# Patient Record
Sex: FEMALE | Race: WHITE | NOT HISPANIC OR LATINO | Employment: FULL TIME | URBAN - METROPOLITAN AREA
[De-identification: names, ages, dates, MRNs, and addresses within clinical notes are randomized per-mention and may not be internally consistent; named-entity substitution may affect disease eponyms.]

---

## 2017-01-26 ENCOUNTER — HOSPITAL ENCOUNTER (EMERGENCY)
Facility: HOSPITAL | Age: 42
Discharge: HOME | End: 2017-01-26
Attending: EMERGENCY MEDICINE
Payer: COMMERCIAL

## 2017-01-26 VITALS
OXYGEN SATURATION: 98 % | SYSTOLIC BLOOD PRESSURE: 118 MMHG | WEIGHT: 214.95 LBS | HEART RATE: 84 BPM | DIASTOLIC BLOOD PRESSURE: 69 MMHG | TEMPERATURE: 97.5 F | RESPIRATION RATE: 18 BRPM

## 2017-01-26 DIAGNOSIS — M54.50 ACUTE MIDLINE LOW BACK PAIN WITHOUT SCIATICA: Primary | ICD-10-CM

## 2017-01-26 PROCEDURE — 96372 THER/PROPH/DIAG INJ SC/IM: CPT

## 2017-01-26 PROCEDURE — A9270 NON-COVERED ITEM OR SERVICE: HCPCS | Performed by: EMERGENCY MEDICINE

## 2017-01-26 PROCEDURE — 99283 EMERGENCY DEPT VISIT LOW MDM: CPT

## 2017-01-26 RX ORDER — KETOROLAC TROMETHAMINE 30 MG/ML
15 INJECTION, SOLUTION INTRAMUSCULAR; INTRAVENOUS ONCE
Status: COMPLETED | OUTPATIENT
Start: 2017-01-26 | End: 2017-01-26

## 2017-01-26 RX ORDER — CYCLOBENZAPRINE HCL 10 MG
10 TABLET ORAL ONCE
Status: COMPLETED | OUTPATIENT
Start: 2017-01-26 | End: 2017-01-26

## 2017-01-26 RX ORDER — METAXALONE 800 MG/1
800 TABLET ORAL 3 TIMES DAILY PRN
Qty: 20 TABLET | Refills: 0 | Status: SHIPPED | OUTPATIENT
Start: 2017-01-26 | End: 2017-02-05

## 2017-01-26 RX ORDER — PREDNISONE 10 MG/1
40 TABLET ORAL DAILY
Qty: 20 TABLET | Refills: 0 | Status: SHIPPED | OUTPATIENT
Start: 2017-01-26 | End: 2017-01-31

## 2017-01-26 RX ADMIN — KETOROLAC TROMETHAMINE 15 MG: 30 INJECTION, SOLUTION INTRAMUSCULAR at 20:14

## 2017-01-26 RX ADMIN — CYCLOBENZAPRINE HYDROCHLORIDE 10 MG: 10 TABLET, FILM COATED ORAL at 20:14

## 2017-03-06 ENCOUNTER — APPOINTMENT (EMERGENCY)
Dept: RADIOLOGY | Facility: HOSPITAL | Age: 42
End: 2017-03-06
Payer: COMMERCIAL

## 2017-03-06 ENCOUNTER — HOSPITAL ENCOUNTER (EMERGENCY)
Facility: HOSPITAL | Age: 42
Discharge: HOME | End: 2017-03-06
Payer: COMMERCIAL

## 2017-03-06 VITALS
OXYGEN SATURATION: 97 % | WEIGHT: 206.13 LBS | TEMPERATURE: 98.4 F | RESPIRATION RATE: 18 BRPM | HEART RATE: 96 BPM | DIASTOLIC BLOOD PRESSURE: 73 MMHG | HEIGHT: 66 IN | BODY MASS INDEX: 33.13 KG/M2 | SYSTOLIC BLOOD PRESSURE: 128 MMHG

## 2017-03-06 DIAGNOSIS — R06.02 SOB (SHORTNESS OF BREATH): ICD-10-CM

## 2017-03-06 DIAGNOSIS — B97.89 SORE THROAT (VIRAL): Primary | ICD-10-CM

## 2017-03-06 DIAGNOSIS — J02.8 SORE THROAT (VIRAL): Primary | ICD-10-CM

## 2017-03-06 LAB
ANION GAP SERPL CALCULATED.3IONS-SCNC: 6 MMOL/L (ref 4–13)
ANION GAP SERPL CALCULATED.3IONS-SCNC: 7 MMOL/L (ref 4–13)
ATRIAL RATE: 98 BPM
BASOPHILS # BLD AUTO: 0.02 THOUSANDS/ΜL (ref 0–0.1)
BASOPHILS NFR BLD AUTO: 0 % (ref 0–1)
BUN SERPL-MCNC: 10 MG/DL (ref 5–25)
BUN SERPL-MCNC: 12 MG/DL (ref 5–25)
CALCIUM SERPL-MCNC: 8.7 MG/DL (ref 8.3–10.1)
CALCIUM SERPL-MCNC: 9.3 MG/DL (ref 8.3–10.1)
CHLORIDE SERPL-SCNC: 97 MMOL/L (ref 100–108)
CHLORIDE SERPL-SCNC: 99 MMOL/L (ref 100–108)
CO2 SERPL-SCNC: 28 MMOL/L (ref 21–32)
CO2 SERPL-SCNC: 28 MMOL/L (ref 21–32)
CREAT SERPL-MCNC: 0.6 MG/DL (ref 0.6–1.3)
CREAT SERPL-MCNC: 0.64 MG/DL (ref 0.6–1.3)
EOSINOPHIL # BLD AUTO: 0.09 THOUSAND/ΜL (ref 0–0.61)
EOSINOPHIL NFR BLD AUTO: 1 % (ref 0–6)
ERYTHROCYTE [DISTWIDTH] IN BLOOD BY AUTOMATED COUNT: 18.7 % (ref 11.6–15.1)
GFR SERPL CREATININE-BSD FRML MDRD: >60 ML/MIN/1.73SQ M
GFR SERPL CREATININE-BSD FRML MDRD: >60 ML/MIN/1.73SQ M
GLUCOSE SERPL-MCNC: 231 MG/DL (ref 65–140)
GLUCOSE SERPL-MCNC: 283 MG/DL (ref 65–140)
HCT VFR BLD AUTO: 34.4 % (ref 34.8–46.1)
HGB BLD-MCNC: 9.9 G/DL (ref 11.5–15.4)
LYMPHOCYTES # BLD AUTO: 2.72 THOUSANDS/ΜL (ref 0.6–4.47)
LYMPHOCYTES NFR BLD AUTO: 31 % (ref 14–44)
MCH RBC QN AUTO: 18.6 PG (ref 26.8–34.3)
MCHC RBC AUTO-ENTMCNC: 28.8 G/DL (ref 31.4–37.4)
MCV RBC AUTO: 65 FL (ref 82–98)
MONOCYTES # BLD AUTO: 0.62 THOUSAND/ΜL (ref 0.17–1.22)
MONOCYTES NFR BLD AUTO: 7 % (ref 4–12)
NEUTROPHILS # BLD AUTO: 5.42 THOUSANDS/ΜL (ref 1.85–7.62)
NEUTS SEG NFR BLD AUTO: 61 % (ref 43–75)
P AXIS: 30 DEGREES
PLATELET # BLD AUTO: 466 THOUSANDS/UL (ref 149–390)
PMV BLD AUTO: 9.5 FL (ref 8.9–12.7)
POTASSIUM SERPL-SCNC: 4.4 MMOL/L (ref 3.5–5.3)
POTASSIUM SERPL-SCNC: 4.5 MMOL/L (ref 3.5–5.3)
PR INTERVAL: 130 MS
QRS AXIS: 21 DEGREES
QRSD INTERVAL: 80 MS
QT INTERVAL: 332 MS
QTC INTERVAL: 423 MS
RBC # BLD AUTO: 5.31 MILLION/UL (ref 3.81–5.12)
S PYO AG THROAT QL: NEGATIVE
SODIUM SERPL-SCNC: 132 MMOL/L (ref 136–145)
SODIUM SERPL-SCNC: 133 MMOL/L (ref 136–145)
T WAVE AXIS: 45 DEGREES
TROPONIN I SERPL-MCNC: <0.02 NG/ML
VENTRICULAR RATE: 98 BPM
WBC # BLD AUTO: 8.87 THOUSAND/UL (ref 4.31–10.16)

## 2017-03-06 PROCEDURE — 85025 COMPLETE CBC W/AUTO DIFF WBC: CPT | Performed by: PHYSICIAN ASSISTANT

## 2017-03-06 PROCEDURE — 87147 CULTURE TYPE IMMUNOLOGIC: CPT | Performed by: PHYSICIAN ASSISTANT

## 2017-03-06 PROCEDURE — 36415 COLL VENOUS BLD VENIPUNCTURE: CPT | Performed by: PHYSICIAN ASSISTANT

## 2017-03-06 PROCEDURE — 87070 CULTURE OTHR SPECIMN AEROBIC: CPT | Performed by: PHYSICIAN ASSISTANT

## 2017-03-06 PROCEDURE — A9270 NON-COVERED ITEM OR SERVICE: HCPCS | Performed by: PHYSICIAN ASSISTANT

## 2017-03-06 PROCEDURE — 71020 HB CHEST X-RAY 2VW FRONTAL&LATL: CPT

## 2017-03-06 PROCEDURE — 80048 BASIC METABOLIC PNL TOTAL CA: CPT | Performed by: PHYSICIAN ASSISTANT

## 2017-03-06 PROCEDURE — 87430 STREP A AG IA: CPT | Performed by: PHYSICIAN ASSISTANT

## 2017-03-06 PROCEDURE — 93005 ELECTROCARDIOGRAM TRACING: CPT

## 2017-03-06 PROCEDURE — 84484 ASSAY OF TROPONIN QUANT: CPT | Performed by: PHYSICIAN ASSISTANT

## 2017-03-06 PROCEDURE — 99285 EMERGENCY DEPT VISIT HI MDM: CPT

## 2017-03-06 PROCEDURE — 96360 HYDRATION IV INFUSION INIT: CPT

## 2017-03-06 PROCEDURE — 93005 ELECTROCARDIOGRAM TRACING: CPT | Performed by: PHYSICIAN ASSISTANT

## 2017-03-06 PROCEDURE — 96361 HYDRATE IV INFUSION ADD-ON: CPT

## 2017-03-06 RX ORDER — ACETAMINOPHEN 325 MG/1
650 TABLET ORAL ONCE
Status: COMPLETED | OUTPATIENT
Start: 2017-03-06 | End: 2017-03-06

## 2017-03-06 RX ORDER — MAGNESIUM HYDROXIDE/ALUMINUM HYDROXICE/SIMETHICONE 120; 1200; 1200 MG/30ML; MG/30ML; MG/30ML
30 SUSPENSION ORAL ONCE
Status: COMPLETED | OUTPATIENT
Start: 2017-03-06 | End: 2017-03-06

## 2017-03-06 RX ADMIN — ALUMINUM HYDROXIDE, MAGNESIUM HYDROXIDE, AND SIMETHICONE 30 ML: 200; 200; 20 SUSPENSION ORAL at 10:17

## 2017-03-06 RX ADMIN — ACETAMINOPHEN 650 MG: 325 TABLET ORAL at 11:22

## 2017-03-06 RX ADMIN — SODIUM CHLORIDE 1000 ML: 0.9 INJECTION, SOLUTION INTRAVENOUS at 10:27

## 2017-03-06 RX ADMIN — LIDOCAINE HYDROCHLORIDE 15 ML: 20 SOLUTION ORAL; TOPICAL at 10:17

## 2017-03-08 LAB — BACTERIA THROAT CULT: NORMAL

## 2017-07-30 ENCOUNTER — OFFICE VISIT (OUTPATIENT)
Dept: URGENT CARE | Facility: MEDICAL CENTER | Age: 42
End: 2017-07-30

## 2017-07-30 PROCEDURE — G0382 LEV 3 HOSP TYPE B ED VISIT: HCPCS

## 2017-09-01 ENCOUNTER — APPOINTMENT (EMERGENCY)
Dept: CT IMAGING | Facility: HOSPITAL | Age: 42
End: 2017-09-01
Payer: COMMERCIAL

## 2017-09-01 ENCOUNTER — HOSPITAL ENCOUNTER (EMERGENCY)
Facility: HOSPITAL | Age: 42
Discharge: HOME | End: 2017-09-01
Attending: EMERGENCY MEDICINE | Admitting: EMERGENCY MEDICINE
Payer: COMMERCIAL

## 2017-09-01 VITALS
HEART RATE: 76 BPM | RESPIRATION RATE: 18 BRPM | WEIGHT: 202.16 LBS | OXYGEN SATURATION: 99 % | DIASTOLIC BLOOD PRESSURE: 79 MMHG | SYSTOLIC BLOOD PRESSURE: 165 MMHG | HEIGHT: 66 IN | TEMPERATURE: 98.4 F | BODY MASS INDEX: 32.49 KG/M2

## 2017-09-01 DIAGNOSIS — R10.9 ABDOMINAL PAIN: Primary | ICD-10-CM

## 2017-09-01 DIAGNOSIS — R19.7 DIARRHEA: ICD-10-CM

## 2017-09-01 DIAGNOSIS — D64.9 ANEMIA, UNSPECIFIED TYPE: ICD-10-CM

## 2017-09-01 LAB
ALBUMIN SERPL BCP-MCNC: 3.4 G/DL (ref 3.5–5)
ALP SERPL-CCNC: 69 U/L (ref 46–116)
ALT SERPL W P-5'-P-CCNC: 59 U/L (ref 12–78)
ANION GAP SERPL CALCULATED.3IONS-SCNC: 9 MMOL/L (ref 4–13)
AST SERPL W P-5'-P-CCNC: 42 U/L (ref 5–45)
BASOPHILS # BLD AUTO: 0.02 THOUSANDS/ΜL (ref 0–0.1)
BASOPHILS NFR BLD AUTO: 0 % (ref 0–1)
BILIRUB SERPL-MCNC: 0.1 MG/DL (ref 0.2–1)
BUN SERPL-MCNC: 11 MG/DL (ref 5–25)
CALCIUM SERPL-MCNC: 9 MG/DL (ref 8.3–10.1)
CHLORIDE SERPL-SCNC: 103 MMOL/L (ref 100–108)
CLARITY, POC: NORMAL
CO2 SERPL-SCNC: 24 MMOL/L (ref 21–32)
COLOR, POC: YELLOW
CREAT SERPL-MCNC: 0.54 MG/DL (ref 0.6–1.3)
EOSINOPHIL # BLD AUTO: 0.18 THOUSAND/ΜL (ref 0–0.61)
EOSINOPHIL NFR BLD AUTO: 2 % (ref 0–6)
ERYTHROCYTE [DISTWIDTH] IN BLOOD BY AUTOMATED COUNT: 18.6 % (ref 11.6–15.1)
EXT BILIRUBIN, UA: NEGATIVE
EXT BLOOD URINE: NEGATIVE
EXT GLUCOSE, UA: NEGATIVE
EXT KETONES: NEGATIVE
EXT NITRITE, UA: NEGATIVE
EXT PH, UA: 6
EXT PROTEIN, UA: NORMAL
EXT SPECIFIC GRAVITY, UA: 1
EXT UROBILINOGEN: NEGATIVE
GFR SERPL CREATININE-BSD FRML MDRD: 116 ML/MIN/1.73SQ M
GLUCOSE SERPL-MCNC: 180 MG/DL (ref 65–140)
HCG UR QL: NEGATIVE
HCT VFR BLD AUTO: 27.9 % (ref 34.8–46.1)
HGB BLD-MCNC: 7.9 G/DL (ref 11.5–15.4)
LIPASE SERPL-CCNC: 256 U/L (ref 73–393)
LYMPHOCYTES # BLD AUTO: 2.24 THOUSANDS/ΜL (ref 0.6–4.47)
LYMPHOCYTES NFR BLD AUTO: 30 % (ref 14–44)
MCH RBC QN AUTO: 17.5 PG (ref 26.8–34.3)
MCHC RBC AUTO-ENTMCNC: 28.3 G/DL (ref 31.4–37.4)
MCV RBC AUTO: 62 FL (ref 82–98)
MONOCYTES # BLD AUTO: 0.63 THOUSAND/ΜL (ref 0.17–1.22)
MONOCYTES NFR BLD AUTO: 8 % (ref 4–12)
NEUTROPHILS # BLD AUTO: 4.53 THOUSANDS/ΜL (ref 1.85–7.62)
NEUTS SEG NFR BLD AUTO: 60 % (ref 43–75)
PLATELET # BLD AUTO: 430 THOUSANDS/UL (ref 149–390)
PMV BLD AUTO: 9.2 FL (ref 8.9–12.7)
POTASSIUM SERPL-SCNC: 4.1 MMOL/L (ref 3.5–5.3)
PROT SERPL-MCNC: 7.5 G/DL (ref 6.4–8.2)
RBC # BLD AUTO: 4.51 MILLION/UL (ref 3.81–5.12)
SODIUM SERPL-SCNC: 136 MMOL/L (ref 136–145)
WBC # BLD AUTO: 7.6 THOUSAND/UL (ref 4.31–10.16)
WBC # BLD EST: NEGATIVE 10*3/UL

## 2017-09-01 PROCEDURE — 85025 COMPLETE CBC W/AUTO DIFF WBC: CPT | Performed by: EMERGENCY MEDICINE

## 2017-09-01 PROCEDURE — 96376 TX/PRO/DX INJ SAME DRUG ADON: CPT

## 2017-09-01 PROCEDURE — 80053 COMPREHEN METABOLIC PANEL: CPT | Performed by: EMERGENCY MEDICINE

## 2017-09-01 PROCEDURE — 96374 THER/PROPH/DIAG INJ IV PUSH: CPT

## 2017-09-01 PROCEDURE — 81025 URINE PREGNANCY TEST: CPT | Performed by: EMERGENCY MEDICINE

## 2017-09-01 PROCEDURE — 83690 ASSAY OF LIPASE: CPT | Performed by: EMERGENCY MEDICINE

## 2017-09-01 PROCEDURE — 96361 HYDRATE IV INFUSION ADD-ON: CPT

## 2017-09-01 PROCEDURE — 81002 URINALYSIS NONAUTO W/O SCOPE: CPT | Performed by: EMERGENCY MEDICINE

## 2017-09-01 PROCEDURE — 99284 EMERGENCY DEPT VISIT MOD MDM: CPT

## 2017-09-01 PROCEDURE — 74177 CT ABD & PELVIS W/CONTRAST: CPT

## 2017-09-01 PROCEDURE — 36415 COLL VENOUS BLD VENIPUNCTURE: CPT | Performed by: EMERGENCY MEDICINE

## 2017-09-01 RX ORDER — FERROUS SULFATE 325(65) MG
325 TABLET ORAL DAILY
Qty: 30 TABLET | Refills: 6 | Status: SHIPPED | OUTPATIENT
Start: 2017-09-01 | End: 2018-08-09

## 2017-09-01 RX ORDER — LISINOPRIL 20 MG/1
20 TABLET ORAL DAILY
COMMUNITY

## 2017-09-01 RX ADMIN — HYDROMORPHONE HYDROCHLORIDE 1 MG: 1 INJECTION, SOLUTION INTRAMUSCULAR; INTRAVENOUS; SUBCUTANEOUS at 07:54

## 2017-09-01 RX ADMIN — IOHEXOL 100 ML: 350 INJECTION, SOLUTION INTRAVENOUS at 07:46

## 2017-09-01 RX ADMIN — HYDROMORPHONE HYDROCHLORIDE 1 MG: 1 INJECTION, SOLUTION INTRAMUSCULAR; INTRAVENOUS; SUBCUTANEOUS at 06:36

## 2017-09-01 RX ADMIN — SODIUM CHLORIDE 1000 ML: 0.9 INJECTION, SOLUTION INTRAVENOUS at 06:36

## 2018-04-03 ENCOUNTER — APPOINTMENT (EMERGENCY)
Dept: CT IMAGING | Facility: HOSPITAL | Age: 43
End: 2018-04-03

## 2018-04-03 ENCOUNTER — HOSPITAL ENCOUNTER (EMERGENCY)
Facility: HOSPITAL | Age: 43
Discharge: HOME | End: 2018-04-03
Attending: EMERGENCY MEDICINE

## 2018-04-03 VITALS
RESPIRATION RATE: 20 BRPM | OXYGEN SATURATION: 99 % | HEIGHT: 65 IN | SYSTOLIC BLOOD PRESSURE: 145 MMHG | BODY MASS INDEX: 34.16 KG/M2 | WEIGHT: 205 LBS | HEART RATE: 105 BPM | DIASTOLIC BLOOD PRESSURE: 82 MMHG

## 2018-04-03 DIAGNOSIS — L02.91 ABSCESS: Primary | ICD-10-CM

## 2018-04-03 PROCEDURE — 99284 EMERGENCY DEPT VISIT MOD MDM: CPT

## 2018-04-03 PROCEDURE — 74176 CT ABD & PELVIS W/O CONTRAST: CPT

## 2018-04-03 RX ORDER — CLINDAMYCIN HYDROCHLORIDE 150 MG/1
300 CAPSULE ORAL EVERY 8 HOURS SCHEDULED
Qty: 28 CAPSULE | Refills: 0 | Status: SHIPPED | OUTPATIENT
Start: 2018-04-03 | End: 2018-04-10

## 2018-04-03 RX ORDER — OXYCODONE HYDROCHLORIDE AND ACETAMINOPHEN 5; 325 MG/1; MG/1
1 TABLET ORAL EVERY 6 HOURS PRN
Qty: 6 TABLET | Refills: 0 | Status: SHIPPED | OUTPATIENT
Start: 2018-04-03 | End: 2018-04-09

## 2018-04-03 RX ORDER — CLINDAMYCIN HYDROCHLORIDE 150 MG/1
300 CAPSULE ORAL ONCE
Status: COMPLETED | OUTPATIENT
Start: 2018-04-03 | End: 2018-04-03

## 2018-04-03 RX ORDER — LIDOCAINE HYDROCHLORIDE AND EPINEPHRINE 10; 10 MG/ML; UG/ML
5 INJECTION, SOLUTION INFILTRATION; PERINEURAL ONCE
Status: COMPLETED | OUTPATIENT
Start: 2018-04-03 | End: 2018-04-03

## 2018-04-03 RX ORDER — OXYCODONE HYDROCHLORIDE AND ACETAMINOPHEN 5; 325 MG/1; MG/1
1 TABLET ORAL ONCE
Status: DISCONTINUED | OUTPATIENT
Start: 2018-04-03 | End: 2018-04-03 | Stop reason: HOSPADM

## 2018-04-03 RX ADMIN — LIDOCAINE HYDROCHLORIDE,EPINEPHRINE BITARTRATE 5 ML: 10; .01 INJECTION, SOLUTION INFILTRATION; PERINEURAL at 16:29

## 2018-04-03 RX ADMIN — CLINDAMYCIN HYDROCHLORIDE 300 MG: 150 CAPSULE ORAL at 15:50

## 2018-04-03 NOTE — ED PROVIDER NOTES
History  Chief Complaint   Patient presents with    Abdominal Pain     Pt states bulge to right side lower abdomen x 2 days and is painful  +nausea  Denies urinary symptoms  History provided by:  Patient  Rash   Location: Lower abdomen  Quality: painful and redness    Pain details:     Quality:  Aching    Severity:  Moderate    Onset quality:  Gradual    Timing:  Constant  Severity:  Moderate  Onset quality:  Gradual  Duration:  2 days  Timing:  Constant  Progression:  Worsening  Chronicity:  New  Context: not animal contact, not chemical exposure, not diapers, not eggs, not exposure to similar rash and not food    Relieved by:  None tried  Worsened by:  Nothing  Ineffective treatments:  None tried  Associated symptoms: abdominal pain    Associated symptoms: no diarrhea, no fatigue, no fever, no headaches, no hoarse voice, no induration, no joint pain, no myalgias, no nausea, no periorbital edema, no shortness of breath, no sore throat, no throat swelling, no tongue swelling, no URI, not vomiting and not wheezing        Prior to Admission Medications   Prescriptions Last Dose Informant Patient Reported? Taking? SITagliptin-MetFORMIN HCl ER (JANUMET XR) 100-1000 MG TB24   Yes No   Sig: Take 1 tablet by mouth daily with dinner   ferrous sulfate 325 (65 Fe) mg tablet   No No   Sig: Take 1 tablet by mouth daily for 30 doses   lisinopril (ZESTRIL) 20 mg tablet   Yes No   Sig: Take 20 mg by mouth daily      Facility-Administered Medications: None       Past Medical History:   Diagnosis Date    Diabetes mellitus ([address] )     Gastritis     Hypertension        Past Surgical History:   Procedure Laterality Date     SECTION         History reviewed  No pertinent family history  I have reviewed and agree with the history as documented      Social History   Substance Use Topics    Smoking status: Current Some Day Smoker    Smokeless tobacco: Not on file    Alcohol use Yes      Comment: socially Review of Systems   Constitutional: Positive for activity change  Negative for appetite change, chills, fatigue and fever  HENT: Negative for congestion, dental problem, ear discharge, ear pain, hoarse voice, mouth sores, postnasal drip, rhinorrhea and sore throat  Respiratory: Negative for shortness of breath and wheezing  Gastrointestinal: Positive for abdominal pain  Negative for diarrhea, nausea and vomiting  Genitourinary: Negative for dysuria, frequency and urgency  Musculoskeletal: Negative for arthralgias and myalgias  Skin: Positive for color change and rash  Neurological: Negative for headaches  Hematological: Negative for adenopathy  Does not bruise/bleed easily  Psychiatric/Behavioral: Negative for confusion  All other systems reviewed and are negative  Physical Exam  ED Triage Vitals [04/03/18 1456]   Temp Pulse Respirations Blood Pressure SpO2   -- 105 20 145/82 99 %      Temp src Heart Rate Source Patient Position - Orthostatic VS BP Location FiO2 (%)   -- -- -- -- --      Pain Score       9           Orthostatic Vital Signs  Vitals:    04/03/18 1456   BP: 145/82   Pulse: 105       Physical Exam   Constitutional: She is oriented to person, place, and time  She appears well-developed and well-nourished  No distress  HENT:   Head: Normocephalic  Right Ear: External ear normal    Left Ear: External ear normal    Nose: Nose normal    Mouth/Throat: Oropharynx is clear and moist    Eyes: Conjunctivae are normal  Right eye exhibits no discharge  Left eye exhibits no discharge  Neck: Neck supple  Cardiovascular: Normal rate, regular rhythm and normal heart sounds  Exam reveals no friction rub  No murmur heard  Pulmonary/Chest: Effort normal and breath sounds normal  No respiratory distress  She has no wheezes  She has no rales  Abdominal: Soft  She exhibits no mass  There is tenderness  There is no rebound and no guarding  Musculoskeletal: She exhibits no edema  Neurological: She is alert and oriented to person, place, and time  Skin: Skin is warm  She is not diaphoretic  Nursing note and vitals reviewed  ED Medications  Medications   oxyCODONE-acetaminophen (PERCOCET) 5-325 mg per tablet 1 tablet (1 tablet Oral Not Given 4/3/18 1629)   lidocaine-epinephrine (XYLOCAINE/EPINEPHRINE) 1 %-1:100,000 injection 5 mL (5 mL Infiltration Given 4/3/18 1629)   clindamycin (CLEOCIN) capsule 300 mg (300 mg Oral Given 4/3/18 1550)       Diagnostic Studies  Results Reviewed     None                 CT abdomen pelvis wo contrast   ED Interpretation by Adam Fishman PA-C (04/03 1616)   No pelvic wall herniation  Focal area of right anterior pelvic wall skin thickening  No collection      Final Result by Autumn Lee MD (04/03 1613)      No pelvic wall herniation  Focal area of right anterior pelvic wall skin thickening  No collection         Workstation performed: [id number]                    Procedures  Incision/Drainage  Date/Time: 4/3/2018 4:31 PM  Performed by: Tiffanie Rowell  Authorized by: Kenn Draper     Patient location:  ED  Other Assisting Provider: No    Consent:     Consent obtained:  Verbal    Consent given by:  Patient    Risks discussed:  Bleeding, incomplete drainage, pain, damage to other organs and infection    Alternatives discussed:  No treatment  Universal protocol:     Procedure explained and questions answered to patient or proxy's satisfaction: yes      Site/side marked: yes      Immediately prior to procedure a time out was called: yes      Patient identity confirmed:  Verbally with patient  Location:     Type:  Abscess    Size:  1 cm x 1 cm  Pre-procedure details:     Skin preparation:  Betadine  Anesthesia (see MAR for exact dosages):      Anesthesia method:  Local infiltration    Local anesthetic:  Lidocaine 1% WITH epi  Procedure details:     Complexity:  Simple    Needle aspiration: no      Incision types:  Single straight    Scalpel blade:  11    Approach:  Open    Incision depth:  Skin and subcutaneous    Wound management:  Irrigated with saline and probed and deloculated    Irrigation with saline:  Copiously    Drainage:  Purulent    Drainage amount: Moderate    Wound treatment:  Packing placed    Packing materials:  1/4 in iodoform gauze    Amount 1/4" iodoform:  6 cm  Post-procedure details:     Patient tolerance of procedure: Tolerated well, no immediate complications           Phone Contacts  ED Phone Contact    ED Course  ED Course as of Apr 03 1650   Tue Apr 03, 2018   1554 Patient is insistant on a CT scan to make sure that this is not a hernia  Will dry scan  1608 No hernia seen                                MDM  Number of Diagnoses or Management Options  Abscess: new and requires workup     Amount and/or Complexity of Data Reviewed  Tests in the radiology section of CPT®: ordered and reviewed  Tests in the medicine section of CPT®: ordered and reviewed    Risk of Complications, Morbidity, and/or Mortality  Presenting problems: moderate  Diagnostic procedures: moderate  Management options: moderate  General comments: Patient presents emergency room with pain in her lower abdomen  She was seen and evaluated and diagnosed with an abscess  She was requesting a CT scan to make sure that she did not have a hernia  CT scan was normal  An I and D was performed  It was packed with iodoform gauze  She was given a prescription for clindamycin  She is a recovering addict so she is going to take her Motrin at home for pain  She also has Suboxone at home that she uses  She will follow up with her family doctor in 2 days for removal of the packing  She also if comfortable, may  remove the packing herself      Patient Progress  Patient progress: stable    CritCare Time    Disposition  Final diagnoses:   Abscess - abdominal wall     Time reflects when diagnosis was documented in both MDM as applicable and the Disposition within this note     Time User Action Codes Description Comment    4/3/2018  3:39 PM Mili Kwan Add [L02 91] Abscess     4/3/2018  3:39 PM Mili Kwan Modify [L02 91] Abscess abdominal wall      ED Disposition     ED Disposition Condition Comment    Discharge  Keke Henson discharge to home/self care  Condition at discharge: Good        Follow-up Information     Follow up With Specialties Details Why Contact Info Additional [address] Emergency Department Emergency Medicine In 2 days for packing removal [address] [facility]  AN ED, [address], [place], [place], [address]        Discharge Medication List as of 4/3/2018  3:42 PM      START taking these medications    Details   clindamycin (CLEOCIN) 150 mg capsule Take 2 capsules (300 mg total) by mouth every 8 (eight) hours for 7 days, Starting Tue 4/3/2018, Until Tue 4/10/2018, Normal      oxyCODONE-acetaminophen (PERCOCET) 5-325 mg per tablet Take 1 tablet by mouth every 6 (six) hours as needed for moderate pain for up to 6 days Max Daily Amount: 4 tablets, Starting Tue 4/3/2018, Until Mon 4/9/2018, Print         CONTINUE these medications which have NOT CHANGED    Details   ferrous sulfate 325 (65 Fe) mg tablet Take 1 tablet by mouth daily for 30 doses, Starting Fri 9/1/2017, Until Sun 10/1/2017, Print      lisinopril (ZESTRIL) 20 mg tablet Take 20 mg by mouth daily, Historical Med      SITagliptin-MetFORMIN HCl ER (JANUMET XR) 100-1000 MG TB24 Take 1 tablet by mouth daily with dinner, Historical Med           No discharge procedures on file      ED Provider  Electronically Signed by           Shane Kilpatrick PA-C  04/03/18 5336

## 2018-08-09 ENCOUNTER — APPOINTMENT (EMERGENCY)
Dept: CT IMAGING | Facility: HOSPITAL | Age: 43
End: 2018-08-09

## 2018-08-09 ENCOUNTER — HOSPITAL ENCOUNTER (EMERGENCY)
Facility: HOSPITAL | Age: 43
Discharge: HOME | End: 2018-08-09
Attending: EMERGENCY MEDICINE | Admitting: EMERGENCY MEDICINE

## 2018-08-09 VITALS
OXYGEN SATURATION: 99 % | SYSTOLIC BLOOD PRESSURE: 127 MMHG | BODY MASS INDEX: 33.6 KG/M2 | TEMPERATURE: 97.7 F | HEART RATE: 74 BPM | DIASTOLIC BLOOD PRESSURE: 75 MMHG | WEIGHT: 201.94 LBS | RESPIRATION RATE: 18 BRPM

## 2018-08-09 DIAGNOSIS — K29.00 ACUTE GASTRITIS: Primary | ICD-10-CM

## 2018-08-09 DIAGNOSIS — R10.84 ACUTE GENERALIZED ABDOMINAL PAIN: ICD-10-CM

## 2018-08-09 LAB
ALBUMIN SERPL BCP-MCNC: 3.5 G/DL (ref 3.5–5)
ALP SERPL-CCNC: 77 U/L (ref 46–116)
ALT SERPL W P-5'-P-CCNC: 60 U/L (ref 12–78)
ANION GAP SERPL CALCULATED.3IONS-SCNC: 8 MMOL/L (ref 4–13)
APTT PPP: 32 SECONDS (ref 24–36)
AST SERPL W P-5'-P-CCNC: 47 U/L (ref 5–45)
BASOPHILS # BLD AUTO: 0.03 THOUSANDS/ΜL (ref 0–0.1)
BASOPHILS NFR BLD AUTO: 0 % (ref 0–1)
BILIRUB DIRECT SERPL-MCNC: 0.09 MG/DL (ref 0–0.2)
BILIRUB SERPL-MCNC: 0.3 MG/DL (ref 0.2–1)
BUN SERPL-MCNC: 11 MG/DL (ref 5–25)
CALCIUM SERPL-MCNC: 8.8 MG/DL (ref 8.3–10.1)
CHLORIDE SERPL-SCNC: 99 MMOL/L (ref 100–108)
CO2 SERPL-SCNC: 26 MMOL/L (ref 21–32)
CREAT SERPL-MCNC: 0.66 MG/DL (ref 0.6–1.3)
EOSINOPHIL # BLD AUTO: 0.22 THOUSAND/ΜL (ref 0–0.61)
EOSINOPHIL NFR BLD AUTO: 3 % (ref 0–6)
ERYTHROCYTE [DISTWIDTH] IN BLOOD BY AUTOMATED COUNT: 18.5 % (ref 11.6–15.1)
GFR SERPL CREATININE-BSD FRML MDRD: 108 ML/MIN/1.73SQ M
GLUCOSE SERPL-MCNC: 235 MG/DL (ref 65–140)
HCT VFR BLD AUTO: 31.5 % (ref 34.8–46.1)
HGB BLD-MCNC: 8.5 G/DL (ref 11.5–15.4)
IMM GRANULOCYTES # BLD AUTO: 0.01 THOUSAND/UL (ref 0–0.2)
IMM GRANULOCYTES NFR BLD AUTO: 0 % (ref 0–2)
INR PPP: 1.02 (ref 0.86–1.17)
LIPASE SERPL-CCNC: 129 U/L (ref 73–393)
LYMPHOCYTES # BLD AUTO: 3.06 THOUSANDS/ΜL (ref 0.6–4.47)
LYMPHOCYTES NFR BLD AUTO: 40 % (ref 14–44)
MCH RBC QN AUTO: 17 PG (ref 26.8–34.3)
MCHC RBC AUTO-ENTMCNC: 27 G/DL (ref 31.4–37.4)
MCV RBC AUTO: 63 FL (ref 82–98)
MONOCYTES # BLD AUTO: 0.64 THOUSAND/ΜL (ref 0.17–1.22)
MONOCYTES NFR BLD AUTO: 8 % (ref 4–12)
NEUTROPHILS # BLD AUTO: 3.75 THOUSANDS/ΜL (ref 1.85–7.62)
NEUTS SEG NFR BLD AUTO: 49 % (ref 43–75)
NRBC BLD AUTO-RTO: 0 /100 WBCS
PLATELET # BLD AUTO: 329 THOUSANDS/UL (ref 149–390)
PMV BLD AUTO: 9.3 FL (ref 8.9–12.7)
POTASSIUM SERPL-SCNC: 3.8 MMOL/L (ref 3.5–5.3)
PROT SERPL-MCNC: 7.4 G/DL (ref 6.4–8.2)
PROTHROMBIN TIME: 13.1 SECONDS (ref 11.8–14.2)
RBC # BLD AUTO: 4.99 MILLION/UL (ref 3.81–5.12)
SODIUM SERPL-SCNC: 133 MMOL/L (ref 136–145)
WBC # BLD AUTO: 7.71 THOUSAND/UL (ref 4.31–10.16)

## 2018-08-09 PROCEDURE — 96376 TX/PRO/DX INJ SAME DRUG ADON: CPT

## 2018-08-09 PROCEDURE — 96374 THER/PROPH/DIAG INJ IV PUSH: CPT

## 2018-08-09 PROCEDURE — 85730 THROMBOPLASTIN TIME PARTIAL: CPT | Performed by: EMERGENCY MEDICINE

## 2018-08-09 PROCEDURE — 36415 COLL VENOUS BLD VENIPUNCTURE: CPT | Performed by: EMERGENCY MEDICINE

## 2018-08-09 PROCEDURE — 85610 PROTHROMBIN TIME: CPT | Performed by: EMERGENCY MEDICINE

## 2018-08-09 PROCEDURE — 99284 EMERGENCY DEPT VISIT MOD MDM: CPT

## 2018-08-09 PROCEDURE — 74177 CT ABD & PELVIS W/CONTRAST: CPT

## 2018-08-09 PROCEDURE — 96361 HYDRATE IV INFUSION ADD-ON: CPT

## 2018-08-09 PROCEDURE — 80076 HEPATIC FUNCTION PANEL: CPT | Performed by: EMERGENCY MEDICINE

## 2018-08-09 PROCEDURE — 85025 COMPLETE CBC W/AUTO DIFF WBC: CPT | Performed by: EMERGENCY MEDICINE

## 2018-08-09 PROCEDURE — 83690 ASSAY OF LIPASE: CPT | Performed by: EMERGENCY MEDICINE

## 2018-08-09 PROCEDURE — 96375 TX/PRO/DX INJ NEW DRUG ADDON: CPT

## 2018-08-09 PROCEDURE — 80048 BASIC METABOLIC PNL TOTAL CA: CPT | Performed by: EMERGENCY MEDICINE

## 2018-08-09 RX ORDER — ONDANSETRON 4 MG/1
4 TABLET, ORALLY DISINTEGRATING ORAL EVERY 8 HOURS PRN
Qty: 20 TABLET | Refills: 0 | Status: SHIPPED | OUTPATIENT
Start: 2018-08-09 | End: 2021-02-17 | Stop reason: HOSPADM

## 2018-08-09 RX ORDER — MORPHINE SULFATE 10 MG/ML
6 INJECTION, SOLUTION INTRAMUSCULAR; INTRAVENOUS ONCE
Status: COMPLETED | OUTPATIENT
Start: 2018-08-09 | End: 2018-08-09

## 2018-08-09 RX ORDER — DICYCLOMINE HCL 20 MG
20 TABLET ORAL 2 TIMES DAILY PRN
Qty: 15 TABLET | Refills: 0 | Status: SHIPPED | OUTPATIENT
Start: 2018-08-09 | End: 2021-02-17 | Stop reason: HOSPADM

## 2018-08-09 RX ORDER — BUPRENORPHINE HYDROCHLORIDE AND NALOXONE HYDROCHLORIDE DIHYDRATE 8; 2 MG/1; MG/1
1 TABLET SUBLINGUAL 2 TIMES DAILY
COMMUNITY

## 2018-08-09 RX ORDER — SUCRALFATE ORAL 1 G/10ML
1 SUSPENSION ORAL 2 TIMES DAILY
Qty: 420 ML | Refills: 0 | Status: SHIPPED | OUTPATIENT
Start: 2018-08-09 | End: 2021-02-17 | Stop reason: HOSPADM

## 2018-08-09 RX ORDER — ONDANSETRON 2 MG/ML
4 INJECTION INTRAMUSCULAR; INTRAVENOUS ONCE
Status: COMPLETED | OUTPATIENT
Start: 2018-08-09 | End: 2018-08-09

## 2018-08-09 RX ORDER — OMEPRAZOLE 20 MG/1
20 CAPSULE, DELAYED RELEASE ORAL DAILY
Qty: 14 CAPSULE | Refills: 0 | Status: SHIPPED | OUTPATIENT
Start: 2018-08-09 | End: 2021-02-17 | Stop reason: HOSPADM

## 2018-08-09 RX ADMIN — MORPHINE SULFATE 6 MG: 10 INJECTION INTRAVENOUS at 08:46

## 2018-08-09 RX ADMIN — ONDANSETRON 4 MG: 2 INJECTION, SOLUTION INTRAMUSCULAR; INTRAVENOUS at 10:40

## 2018-08-09 RX ADMIN — IOHEXOL 100 ML: 350 INJECTION, SOLUTION INTRAVENOUS at 10:21

## 2018-08-09 RX ADMIN — SODIUM CHLORIDE 1000 ML: 0.9 INJECTION, SOLUTION INTRAVENOUS at 08:23

## 2018-08-09 RX ADMIN — ONDANSETRON 4 MG: 2 INJECTION INTRAMUSCULAR; INTRAVENOUS at 08:24

## 2018-08-09 NOTE — ED PROVIDER NOTES
History  Chief Complaint   Patient presents with    Vomiting     states she woke up approx two hours ago and began vomiting  denies diarrhea or any other recent illness  hx of gastritis  History provided by:  Patient  Vomiting   Severity:  Moderate  Duration:  2 hours  Timing:  Intermittent  Number of daily episodes:  8  Quality:  Stomach contents (Now dry heaving)  Progression:  Unchanged  Chronicity:  New  Recent urination:  Normal  Context comment:  States last night she was up all night studying went to sleep at 4:00 a m  woke up 2 hours later vomiting , dry heaving since  Complaining of epigastric abdominal pain as well  Relieved by:  None tried  Exacerbated by: Attempting to drink or eat  Ineffective treatments:  None tried  Associated symptoms: abdominal pain    Associated symptoms: no chills, no cough, no diarrhea, no fever, no headaches and no sore throat    Abdominal pain:     Location:  Epigastric    Quality: aching and burning      Severity:  Moderate    Onset quality:  Gradual    Duration:  2 hours    Timing:  Constant    Progression:  Worsening    Chronicity:  New      Prior to Admission Medications   Prescriptions Last Dose Informant Patient Reported? Taking? buprenorphine-naloxone (SUBOXONE) 8-2 mg per SL tablet   Yes Yes   Sig: Place 1 tablet under the tongue 2 (two) times a day   lisinopril (ZESTRIL) 20 mg tablet   Yes Yes   Sig: Take 20 mg by mouth daily      Facility-Administered Medications: None       Past Medical History:   Diagnosis Date    Diabetes mellitus ([facility] [address] )     Gastritis     Hypertension        Past Surgical History:   Procedure Laterality Date     SECTION         History reviewed  No pertinent family history  I have reviewed and agree with the history as documented      Social History   Substance Use Topics    Smoking status: Current Some Day Smoker    Smokeless tobacco: Never Used    Alcohol use Yes      Comment: socially        Review of Systems Constitutional: Negative for activity change, chills, diaphoresis and fever  HENT: Negative for congestion, sinus pressure and sore throat  Eyes: Negative for pain and visual disturbance  Respiratory: Negative for cough, chest tightness, shortness of breath, wheezing and stridor  Cardiovascular: Negative for chest pain and palpitations  Gastrointestinal: Positive for abdominal pain and vomiting  Negative for abdominal distention, constipation, diarrhea and nausea  Genitourinary: Negative for dysuria and frequency  Musculoskeletal: Negative for neck pain and neck stiffness  Skin: Negative for rash  Neurological: Negative for dizziness, speech difficulty, light-headedness, numbness and headaches  Physical Exam  Physical Exam   Constitutional: She is oriented to person, place, and time  She appears well-developed  No distress  HENT:   Head: Normocephalic and atraumatic  Eyes: Pupils are equal, round, and reactive to light  Neck: Normal range of motion  Neck supple  No tracheal deviation present  Cardiovascular: Normal rate, regular rhythm, normal heart sounds and intact distal pulses  No murmur heard  Pulmonary/Chest: Effort normal and breath sounds normal  No stridor  No respiratory distress  Abdominal: Soft  She exhibits no distension  There is tenderness (epigastric)  There is no rebound and no guarding  Musculoskeletal: Normal range of motion  Neurological: She is alert and oriented to person, place, and time  Skin: Skin is warm and dry  She is not diaphoretic  No erythema  No pallor  Psychiatric: She has a normal mood and affect  Vitals reviewed        Vital Signs  ED Triage Vitals   Temperature Pulse Respirations Blood Pressure SpO2   08/09/18 0802 08/09/18 0802 08/09/18 0802 08/09/18 0802 08/09/18 0802   97 7 °F (36 5 °C) 72 18 133/85 99 %      Temp src Heart Rate Source Patient Position - Orthostatic VS BP Location FiO2 (%)   -- 08/09/18 0802 08/09/18 7513 08/09/18 0802 --    Monitor Sitting Right arm       Pain Score       08/09/18 0846       Worst Possible Pain           Vitals:    08/09/18 0802 08/09/18 0930 08/09/18 1000   BP: 133/85 137/68 127/75   Pulse: 72 76 74   Patient Position - Orthostatic VS: Sitting         Visual Acuity      ED Medications  Medications   sodium chloride 0 9 % bolus 1,000 mL (1,000 mL Intravenous New Bag 8/9/18 0823)   ondansetron (ZOFRAN) injection 4 mg (4 mg Intravenous Given 8/9/18 0824)   morphine (PF) 10 mg/mL injection 6 mg (6 mg Intravenous Given 8/9/18 0846)   iohexol (OMNIPAQUE) 350 MG/ML injection (MULTI-DOSE) 100 mL (100 mL Intravenous Given 8/9/18 1021)   ondansetron (ZOFRAN) injection 4 mg (4 mg Intravenous Given 8/9/18 1040)       Diagnostic Studies  Results Reviewed     Procedure Component Value Units Date/Time    Hepatic function panel [[id number]]  (Abnormal) Collected:  08/09/18 0826    Lab Status:  Final result Specimen:  Blood from Arm, Left Updated:  08/09/18 0906     Total Bilirubin 0 30 mg/dL      Bilirubin, Direct 0 09 mg/dL      Alkaline Phosphatase 77 U/L      AST 47 (H) U/L      ALT 60 U/L      Total Protein 7 4 g/dL      Albumin 3 5 g/dL     Basic metabolic panel [[id number]]  (Abnormal) Collected:  08/09/18 0826    Lab Status:  Final result Specimen:  Blood from Arm, Left Updated:  08/09/18 5160     Sodium 133 (L) mmol/L      Potassium 3 8 mmol/L      Chloride 99 (L) mmol/L      CO2 26 mmol/L      Anion Gap 8 mmol/L      BUN 11 mg/dL      Creatinine 0 66 mg/dL      Glucose 235 (H) mg/dL      Calcium 8 8 mg/dL      eGFR 108 ml/min/1 73sq m     Narrative:         National Kidney Disease Education Program recommendations are as follows:  GFR calculation is accurate only with a steady state creatinine  Chronic Kidney disease less than 60 ml/min/1 73 sq  meters  Kidney failure less than 15 ml/min/1 73 sq  meters      Lipase [[id number]]  (Normal) Collected:  08/09/18 0826    Lab Status:  Final result Specimen:  Blood from Arm, Left Updated:  08/09/18 0855     Lipase 129 u/L     Protime-INR [[id number]]  (Normal) Collected:  08/09/18 0826    Lab Status:  Final result Specimen:  Blood from Arm, Left Updated:  08/09/18 0855     Protime 13 1 seconds      INR 1 02    APTT [[id number]]  (Normal) Collected:  08/09/18 0826    Lab Status:  Final result Specimen:  Blood from Arm, Left Updated:  08/09/18 0855     PTT 32 seconds     CBC and differential [[id number]]  (Abnormal) Collected:  08/09/18 0826    Lab Status:  Final result Specimen:  Blood from Arm, Left Updated:  08/09/18 0833     WBC 7 71 Thousand/uL      RBC 4 99 Million/uL      Hemoglobin 8 5 (L) g/dL      Hematocrit 31 5 (L) %      MCV 63 (L) fL      MCH 17 0 (L) pg      MCHC 27 0 (L) g/dL      RDW 18 5 (H) %      MPV 9 3 fL      Platelets 860 Thousands/uL      nRBC 0 /100 WBCs      Neutrophils Relative 49 %      Immat GRANS % 0 %      Lymphocytes Relative 40 %      Monocytes Relative 8 %      Eosinophils Relative 3 %      Basophils Relative 0 %      Neutrophils Absolute 3 75 Thousands/µL      Immature Grans Absolute 0 01 Thousand/uL      Lymphocytes Absolute 3 06 Thousands/µL      Monocytes Absolute 0 64 Thousand/µL      Eosinophils Absolute 0 22 Thousand/µL      Basophils Absolute 0 03 Thousands/µL                  CT abdomen pelvis with contrast   Final Result by Korey Curtis DO (08/09 1045)   1  Mild hepatomegaly with fatty infiltration  2   Mild splenomegaly  Otherwise, unremarkable enhanced CT scan of the abdomen and pelvis  Workstation performed: [id number]                    Procedures  Procedures       Phone Contacts  ED Phone Contact    ED Course  ED Course as of Aug 09 1105   Thu Aug 09, 2018   8656 Patient anemic but this is actually increased from previous blood draw which was 7 9   11 months ago    0839 Called patient's room pain worsening  , will give dose of morphine will order CT                                MDM  Number of Diagnoses or Management Options  Acute gastritis: new and requires workup  Acute generalized abdominal pain: new and requires workup  Diagnosis management comments: No etiology found for patient's pain  , will start on PPI and Carafate follow up with outpatient Gastroenterology  Amount and/or Complexity of Data Reviewed  Clinical lab tests: ordered and reviewed  Tests in the radiology section of CPT®: ordered and reviewed  Review and summarize past medical records: yes  Independent visualization of images, tracings, or specimens: yes      CritCare Time    Disposition  Final diagnoses:   Acute gastritis   Acute generalized abdominal pain     Time reflects when diagnosis was documented in both MDM as applicable and the Disposition within this note     Time User Action Codes Description Comment    8/9/2018 10:58 AM Faith DANIEL Add [K29 00] Acute gastritis     8/9/2018 10:58 AM Nisreen Blank Add [R10 84] Acute generalized abdominal pain       ED Disposition     ED Disposition Condition Comment    Discharge  Vester Grady discharge to home/self care  Condition at discharge: Good        Follow-up Information     Follow up With Specialties Details Why Contact Info    Taylor Rajan MD Family Medicine Call today To arrange for the next available appointment [address]      Skylar Cummings MD Gastroenterology Call today To arrange for the next available appointment [address] [place] [address] 8  [phone]            Patient's Medications   Discharge Prescriptions    OMEPRAZOLE (PRILOSEC) 20 MG DELAYED RELEASE CAPSULE    Take 1 capsule (20 mg total) by mouth daily for 14 days       Start Date: 8/9/2018  End Date: 8/23/2018       Order Dose: 20 mg       Quantity: 14 capsule    Refills: 0    ONDANSETRON (ZOFRAN-ODT) 4 MG DISINTEGRATING TABLET    Take 1 tablet (4 mg total) by mouth every 8 (eight) hours as needed for nausea or vomiting for up to 15 doses       Start Date: 8/9/2018  End Date: --       Order Dose: 4 mg       Quantity: 20 tablet    Refills: 0    SUCRALFATE (CARAFATE) 1 G/10 ML SUSPENSION    Take 10 mL (1 g total) by mouth 2 (two) times a day for 30 days       Start Date: 8/9/2018  End Date: 9/8/2018       Order Dose: 1 g       Quantity: 420 mL    Refills: 0     No discharge procedures on file      ED Provider  Electronically Signed by           Gilberto Landeros DO  08/09/18 1101

## 2018-08-09 NOTE — DISCHARGE INSTRUCTIONS
Gastritis   WHAT YOU NEED TO KNOW:   Gastritis is inflammation or irritation of the lining of your stomach  DISCHARGE INSTRUCTIONS:   Call 911 for any of the following:   · You develop chest pain or shortness of breath  Seek care immediately if:   · You vomit blood  · You have black or bloody bowel movements  · You have severe stomach or back pain  Contact your healthcare provider if:   · You have a fever  · You have new or worsening symptoms, even after treatment  · You have questions or concerns about your condition or care  Medicines:   · Medicines  may be given to help treat a bacterial infection or decrease stomach acid  · Take your medicine as directed  Contact your healthcare provider if you think your medicine is not helping or if you have side effects  Tell him or her if you are allergic to any medicine  Keep a list of the medicines, vitamins, and herbs you take  Include the amounts, and when and why you take them  Bring the list or the pill bottles to follow-up visits  Carry your medicine list with you in case of an emergency  Manage or prevent gastritis:   · Do not smoke  Nicotine and other chemicals in cigarettes and cigars can make your symptoms worse and cause lung damage  Ask your healthcare provider for information if you currently smoke and need help to quit  E-cigarettes or smokeless tobacco still contain nicotine  Talk to your healthcare provider before you use these products  · Do not drink alcohol  Alcohol can prevent healing and make your gastritis worse  Talk to your healthcare provider if you need help to stop drinking  · Do not take NSAIDs or aspirin unless directed  These and similar medicines can cause irritation  If your healthcare provider says it is okay to take NSAIDs, take them with food  · Do not eat foods that cause irritation  Foods such as oranges and salsa can cause burning or pain  Eat a variety of healthy foods   Examples include fruits (not citrus), vegetables, low-fat dairy products, beans, whole-grain breads, and lean meats and fish  Try to eat small meals, and drink water with your meals  Do not eat for at least 3 hours before you go to bed  · Find ways to relax and decrease stress  Stress can increase stomach acid and make gastritis worse  Activities such as yoga, meditation, or listening to music can help you relax  Spend time with friends, or do things you enjoy  Follow up with your healthcare provider as directed: You may need ongoing tests or treatment, or referral to a gastroenterologist  Write down your questions so you remember to ask them during your visits  © 2017 [employer] Information is for End User's use only and may not be sold, redistributed or otherwise used for commercial purposes  All illustrations and images included in CareNotes® are the copyrighted property of [employer]  or [employer]  The above information is an  only  It is not intended as medical advice for individual conditions or treatments  Talk to your doctor, nurse or pharmacist before following any medical regimen to see if it is safe and effective for you

## 2021-02-15 ENCOUNTER — HOSPITAL ENCOUNTER (OUTPATIENT)
Facility: HOSPITAL | Age: 46
Setting detail: OBSERVATION
Discharge: HOME | End: 2021-02-17
Attending: EMERGENCY MEDICINE | Admitting: INTERNAL MEDICINE
Payer: COMMERCIAL

## 2021-02-15 DIAGNOSIS — R53.1 GENERALIZED WEAKNESS: ICD-10-CM

## 2021-02-15 DIAGNOSIS — G89.29 CHRONIC LOW BACK PAIN: ICD-10-CM

## 2021-02-15 DIAGNOSIS — E11.65 UNCONTROLLED TYPE 2 DIABETES MELLITUS WITH HYPERGLYCEMIA (HCC): ICD-10-CM

## 2021-02-15 DIAGNOSIS — M54.50 CHRONIC LOW BACK PAIN: ICD-10-CM

## 2021-02-15 DIAGNOSIS — D62 ACUTE BLOOD LOSS ANEMIA: Primary | ICD-10-CM

## 2021-02-15 DIAGNOSIS — I10 ESSENTIAL HYPERTENSION: ICD-10-CM

## 2021-02-15 DIAGNOSIS — D62 ACUTE BLOOD LOSS ANEMIA: ICD-10-CM

## 2021-02-15 LAB
ABO GROUP BLD: NORMAL
ABO GROUP BLD: NORMAL
ANION GAP SERPL CALCULATED.3IONS-SCNC: 9 MMOL/L (ref 4–13)
BASOPHILS # BLD AUTO: 0.01 THOUSANDS/ΜL (ref 0–0.1)
BASOPHILS NFR BLD AUTO: 0 % (ref 0–1)
BLD GP AB SCN SERPL QL: NEGATIVE
BUN SERPL-MCNC: 7 MG/DL (ref 5–25)
CALCIUM SERPL-MCNC: 8.4 MG/DL (ref 8.3–10.1)
CHLORIDE SERPL-SCNC: 99 MMOL/L (ref 100–108)
CO2 SERPL-SCNC: 25 MMOL/L (ref 21–32)
CREAT SERPL-MCNC: 0.67 MG/DL (ref 0.6–1.3)
EOSINOPHIL # BLD AUTO: 0.06 THOUSAND/ΜL (ref 0–0.61)
EOSINOPHIL NFR BLD AUTO: 1 % (ref 0–6)
ERYTHROCYTE [DISTWIDTH] IN BLOOD BY AUTOMATED COUNT: 19.4 % (ref 11.6–15.1)
FERRITIN SERPL-MCNC: 2 NG/ML (ref 8–388)
GFR SERPL CREATININE-BSD FRML MDRD: 106 ML/MIN/1.73SQ M
GLUCOSE SERPL-MCNC: 306 MG/DL (ref 65–140)
HCT VFR BLD AUTO: 22.9 % (ref 34.8–46.1)
HGB BLD-MCNC: 5.7 G/DL (ref 11.5–15.4)
IMM GRANULOCYTES # BLD AUTO: 0.03 THOUSAND/UL (ref 0–0.2)
IMM GRANULOCYTES NFR BLD AUTO: 1 % (ref 0–2)
IRON SATN MFR SERPL: 5 %
IRON SERPL-MCNC: 25 UG/DL (ref 50–170)
LYMPHOCYTES # BLD AUTO: 1.88 THOUSANDS/ΜL (ref 0.6–4.47)
LYMPHOCYTES NFR BLD AUTO: 34 % (ref 14–44)
MCH RBC QN AUTO: 16.5 PG (ref 26.8–34.3)
MCHC RBC AUTO-ENTMCNC: 24.9 G/DL (ref 31.4–37.4)
MCV RBC AUTO: 66 FL (ref 82–98)
MONOCYTES # BLD AUTO: 0.43 THOUSAND/ΜL (ref 0.17–1.22)
MONOCYTES NFR BLD AUTO: 8 % (ref 4–12)
NEUTROPHILS # BLD AUTO: 3.16 THOUSANDS/ΜL (ref 1.85–7.62)
NEUTS SEG NFR BLD AUTO: 56 % (ref 43–75)
NRBC BLD AUTO-RTO: 0 /100 WBCS
PLATELET # BLD AUTO: 217 THOUSANDS/UL (ref 149–390)
PMV BLD AUTO: 9.6 FL (ref 8.9–12.7)
POTASSIUM SERPL-SCNC: 3.1 MMOL/L (ref 3.5–5.3)
RBC # BLD AUTO: 3.45 MILLION/UL (ref 3.81–5.12)
RH BLD: POSITIVE
RH BLD: POSITIVE
SODIUM SERPL-SCNC: 133 MMOL/L (ref 136–145)
SPECIMEN EXPIRATION DATE: NORMAL
TIBC SERPL-MCNC: 467 UG/DL (ref 250–450)
TROPONIN I SERPL-MCNC: <0.02 NG/ML
WBC # BLD AUTO: 5.57 THOUSAND/UL (ref 4.31–10.16)

## 2021-02-15 PROCEDURE — 82728 ASSAY OF FERRITIN: CPT | Performed by: EMERGENCY MEDICINE

## 2021-02-15 PROCEDURE — 86901 BLOOD TYPING SEROLOGIC RH(D): CPT | Performed by: EMERGENCY MEDICINE

## 2021-02-15 PROCEDURE — 83036 HEMOGLOBIN GLYCOSYLATED A1C: CPT | Performed by: PHYSICIAN ASSISTANT

## 2021-02-15 PROCEDURE — 93005 ELECTROCARDIOGRAM TRACING: CPT

## 2021-02-15 PROCEDURE — 99285 EMERGENCY DEPT VISIT HI MDM: CPT

## 2021-02-15 PROCEDURE — 86850 RBC ANTIBODY SCREEN: CPT | Performed by: EMERGENCY MEDICINE

## 2021-02-15 PROCEDURE — 84484 ASSAY OF TROPONIN QUANT: CPT | Performed by: EMERGENCY MEDICINE

## 2021-02-15 PROCEDURE — 36415 COLL VENOUS BLD VENIPUNCTURE: CPT | Performed by: EMERGENCY MEDICINE

## 2021-02-15 PROCEDURE — 86900 BLOOD TYPING SEROLOGIC ABO: CPT | Performed by: EMERGENCY MEDICINE

## 2021-02-15 PROCEDURE — 99291 CRITICAL CARE FIRST HOUR: CPT | Performed by: EMERGENCY MEDICINE

## 2021-02-15 PROCEDURE — 99219 PR INITIAL OBSERVATION CARE/DAY 50 MINUTES: CPT | Performed by: INTERNAL MEDICINE

## 2021-02-15 PROCEDURE — 83550 IRON BINDING TEST: CPT | Performed by: EMERGENCY MEDICINE

## 2021-02-15 PROCEDURE — 86920 COMPATIBILITY TEST SPIN: CPT

## 2021-02-15 PROCEDURE — P9016 RBC LEUKOCYTES REDUCED: HCPCS

## 2021-02-15 PROCEDURE — 99220 PR INITIAL OBSERVATION CARE/DAY 70 MINUTES: CPT | Performed by: INTERNAL MEDICINE

## 2021-02-15 PROCEDURE — 83540 ASSAY OF IRON: CPT | Performed by: EMERGENCY MEDICINE

## 2021-02-15 PROCEDURE — 85025 COMPLETE CBC W/AUTO DIFF WBC: CPT | Performed by: EMERGENCY MEDICINE

## 2021-02-15 PROCEDURE — 80048 BASIC METABOLIC PNL TOTAL CA: CPT | Performed by: EMERGENCY MEDICINE

## 2021-02-15 RX ORDER — ONDANSETRON 2 MG/ML
4 INJECTION INTRAMUSCULAR; INTRAVENOUS EVERY 6 HOURS PRN
Status: DISCONTINUED | OUTPATIENT
Start: 2021-02-15 | End: 2021-02-17 | Stop reason: HOSPADM

## 2021-02-15 RX ORDER — ACETAMINOPHEN 325 MG/1
650 TABLET ORAL EVERY 6 HOURS PRN
Status: DISCONTINUED | OUTPATIENT
Start: 2021-02-15 | End: 2021-02-17 | Stop reason: HOSPADM

## 2021-02-15 RX ORDER — DIPHENHYDRAMINE HCL 25 MG
25 TABLET ORAL
Status: DISCONTINUED | OUTPATIENT
Start: 2021-02-15 | End: 2021-02-17 | Stop reason: HOSPADM

## 2021-02-15 RX ORDER — CALCIUM CARBONATE 200(500)MG
1000 TABLET,CHEWABLE ORAL DAILY PRN
Status: DISCONTINUED | OUTPATIENT
Start: 2021-02-15 | End: 2021-02-17 | Stop reason: HOSPADM

## 2021-02-15 RX ORDER — DOCUSATE SODIUM 100 MG/1
100 CAPSULE, LIQUID FILLED ORAL 2 TIMES DAILY
Status: DISCONTINUED | OUTPATIENT
Start: 2021-02-15 | End: 2021-02-17 | Stop reason: HOSPADM

## 2021-02-15 RX ORDER — BUPRENORPHINE AND NALOXONE 8; 2 MG/1; MG/1
1 FILM, SOLUBLE BUCCAL; SUBLINGUAL 2 TIMES DAILY
Status: DISCONTINUED | OUTPATIENT
Start: 2021-02-15 | End: 2021-02-17 | Stop reason: HOSPADM

## 2021-02-15 RX ADMIN — ACETAMINOPHEN 650 MG: 325 TABLET, FILM COATED ORAL at 20:21

## 2021-02-15 RX ADMIN — DIPHENHYDRAMINE HCL 25 MG: 25 TABLET, COATED ORAL at 20:21

## 2021-02-15 NOTE — ED PROVIDER NOTES
History  Chief Complaint   Patient presents with    Shortness of Breath     pt had low hgb a few weeks ago was told to take iron does not feel like it is helping and iron makes her sick     50-year-old female, increasing weakness, had outpatient blood work done for which she showed me on her phone, which showed a hemoglobin of 5 4  Also new onset diabetes  Presents today due to worsening shortness of breath  , with a hemoglobin 5 4 she was started on iron pills, says that she is feeling significantly worse inguinal unable to tolerate the iron pills due to nausea , now dyspnea with minimal exertion  , no falls no injury no trauma  No associated chest pain  Patient believes her anemia is due to heavy menses  She says that she always has heavy menses but particularly her most recent 1 which was 2 weeks ago she was going through a pad an hour for about a week and a half  No other modifying or alleviating factors  History provided by:  Patient and spouse  Shortness of Breath  Severity:  Severe  Onset quality:  Gradual  Duration:  2 weeks  Timing:  Intermittent  Progression:  Waxing and waning  Chronicity:  New  Context: activity    Relieved by:  Rest  Worsened by: Activity  Ineffective treatments:  None tried  Associated symptoms: no abdominal pain, no chest pain, no cough, no diaphoresis, no fever, no headaches, no neck pain, no rash, no sore throat, no vomiting and no wheezing        Prior to Admission Medications   Prescriptions Last Dose Informant Patient Reported? Taking?    buprenorphine-naloxone (SUBOXONE) 8-2 mg per SL tablet 2/15/2021 at Unknown time  Yes Yes   Sig: Place 1 tablet under the tongue 2 (two) times a day   dicyclomine (BENTYL) 20 mg tablet Not Taking at Unknown time  No No   Sig: Take 1 tablet (20 mg total) by mouth 2 (two) times a day as needed (Abdominal pain/cramping)   Patient not taking: Reported on 2/15/2021   lisinopril (ZESTRIL) 20 mg tablet 2/15/2021 at Unknown time  Yes Yes Sig: Take 20 mg by mouth daily   omeprazole (PriLOSEC) 20 mg delayed release capsule Not Taking at Unknown time  No No   Sig: Take 1 capsule (20 mg total) by mouth daily for 14 days   Patient not taking: Reported on 2/15/2021   ondansetron (ZOFRAN-ODT) 4 mg disintegrating tablet Not Taking at Unknown time  No No   Sig: Take 1 tablet (4 mg total) by mouth every 8 (eight) hours as needed for nausea or vomiting for up to 15 doses   Patient not taking: Reported on 2/15/2021   sucralfate (CARAFATE) 1 g/10 mL suspension Not Taking at Unknown time  No No   Sig: Take 10 mL (1 g total) by mouth 2 (two) times a day for 30 days   Patient not taking: Reported on 2/15/2021      Facility-Administered Medications: None       Past Medical History:   Diagnosis Date    Diabetes mellitus ([facility] [address] )     Gastritis     Hypertension        Past Surgical History:   Procedure Laterality Date     SECTION         History reviewed  No pertinent family history  I have reviewed and agree with the history as documented  E-Cigarette/Vaping    E-Cigarette Use Never User      E-Cigarette/Vaping Substances     Social History     Tobacco Use    Smoking status: Current Some Day Smoker    Smokeless tobacco: Never Used   Substance Use Topics    Alcohol use: Yes     Comment: socially    Drug use: No       Review of Systems   Constitutional: Positive for activity change  Negative for chills, diaphoresis and fever  HENT: Negative for congestion, sinus pressure and sore throat  Eyes: Negative for pain and visual disturbance  Respiratory: Positive for shortness of breath  Negative for cough, chest tightness, wheezing and stridor  Cardiovascular: Negative for chest pain and palpitations  Gastrointestinal: Negative for abdominal distention, abdominal pain, constipation, diarrhea, nausea and vomiting  Genitourinary: Negative for dysuria and frequency  Musculoskeletal: Negative for neck pain and neck stiffness     Skin: Negative for rash  Neurological: Negative for dizziness, speech difficulty, light-headedness, numbness and headaches  Physical Exam  Physical Exam  Vitals signs reviewed  Constitutional:       Appearance: She is well-developed  She is not diaphoretic  HENT:      Head: Normocephalic and atraumatic  Right Ear: External ear normal       Left Ear: External ear normal       Nose: Nose normal    Eyes:      General:         Right eye: No discharge  Left eye: No discharge  Pupils: Pupils are equal, round, and reactive to light  Neck:      Musculoskeletal: Normal range of motion and neck supple  Trachea: No tracheal deviation  Cardiovascular:      Rate and Rhythm: Regular rhythm  Tachycardia present  Heart sounds: Normal heart sounds  No murmur  Pulmonary:      Effort: Pulmonary effort is normal  No tachypnea or respiratory distress  Breath sounds: Normal breath sounds  No stridor  Abdominal:      General: There is no distension  Palpations: Abdomen is soft  Tenderness: There is no abdominal tenderness  There is no guarding or rebound  Musculoskeletal: Normal range of motion  Skin:     General: Skin is warm and dry  Coloration: Skin is pale  Findings: No erythema or rash  Neurological:      General: No focal deficit present  Mental Status: She is alert and oriented to person, place, and time           Vital Signs  ED Triage Vitals   Temperature Pulse Respirations Blood Pressure SpO2   02/15/21 1403 02/15/21 1403 02/15/21 1421 02/15/21 1403 02/15/21 1403   98 °F (36 7 °C) (!) 115 18 140/63 98 %      Temp Source Heart Rate Source Patient Position - Orthostatic VS BP Location FiO2 (%)   02/15/21 1421 02/15/21 1421 02/15/21 1403 02/15/21 1403 --   Oral Monitor Sitting Left arm       Pain Score       02/15/21 1403       No Pain           Vitals:    02/16/21 0803 02/16/21 1527 02/16/21 2227 02/17/21 0714   BP: 92/50 139/65 166/81 126/60   Pulse: 73 75 86 70 Patient Position - Orthostatic VS: Lying Lying Lying Lying         Visual Acuity  Visual Acuity      Most Recent Value   L Pupil Size (mm)  3   R Pupil Size (mm)  3   L Pupil Shape  Round   R Pupil Shape  Round          ED Medications  Medications   potassium chloride (K-DUR,KLOR-CON) CR tablet 40 mEq (40 mEq Oral Given 2/16/21 1352)   potassium chloride (K-DUR,KLOR-CON) CR tablet 40 mEq (40 mEq Oral Given 2/17/21 0959)       Diagnostic Studies  Results Reviewed     Procedure Component Value Units Date/Time    Comprehensive metabolic panel [[id number]]  (Abnormal) Collected: 02/16/21 0509    Lab Status: Final result Specimen: Blood from Arm, Left Updated: 02/16/21 0545     Sodium 137 mmol/L      Potassium 3 3 mmol/L      Chloride 102 mmol/L      CO2 25 mmol/L      ANION GAP 10 mmol/L      BUN 8 mg/dL      Creatinine 0 67 mg/dL      Glucose 220 mg/dL      Calcium 8 5 mg/dL      Corrected Calcium 9 2 mg/dL      AST 23 U/L      ALT 26 U/L      Alkaline Phosphatase 69 U/L      Total Protein 7 4 g/dL      Albumin 3 1 g/dL      Total Bilirubin 0 57 mg/dL      eGFR 106 ml/min/1 73sq m     Narrative:      [place] guidelines for Chronic Kidney Disease (CKD):     Stage 1 with normal or high GFR (GFR > 90 mL/min/1 73 square meters)    Stage 2 Mild CKD (GFR = 60-89 mL/min/1 73 square meters)    Stage 3A Moderate CKD (GFR = 45-59 mL/min/1 73 square meters)    Stage 3B Moderate CKD (GFR = 30-44 mL/min/1 73 square meters)    Stage 4 Severe CKD (GFR = 15-29 mL/min/1 73 square meters)    Stage 5 End Stage CKD (GFR <15 mL/min/1 73 square meters)  Note: GFR calculation is accurate only with a steady state creatinine    CBC and differential [[id number]]  (Abnormal) Collected: 02/16/21 0509    Lab Status: Final result Specimen: Blood from Arm, Left Updated: 02/16/21 0520     WBC 6 13 Thousand/uL      RBC 3 93 Million/uL      Hemoglobin 7 4 g/dL      Hematocrit 27 7 %      MCV 71 fL      MCH 18 8 pg MCHC 26 7 g/dL      RDW 22 2 %      MPV 9 5 fL      Platelets 848 Thousands/uL      nRBC 0 /100 WBCs      Neutrophils Relative 41 %      Immat GRANS % 0 %      Lymphocytes Relative 50 %      Monocytes Relative 7 %      Eosinophils Relative 2 %      Basophils Relative 0 %      Neutrophils Absolute 2 52 Thousands/µL      Immature Grans Absolute 0 01 Thousand/uL      Lymphocytes Absolute 3 03 Thousands/µL      Monocytes Absolute 0 45 Thousand/µL      Eosinophils Absolute 0 10 Thousand/µL      Basophils Absolute 0 02 Thousands/µL     Hemoglobin A1C w/ EAG Estimation [[id number]]  (Abnormal) Collected: 02/15/21 1453    Lab Status: Final result Specimen: Blood from Arm, Left Updated: 02/16/21 0511     Hemoglobin A1C 8 4 %       mg/dl     Iron Saturation % [[id number]]  (Abnormal) Collected: 02/15/21 1453    Lab Status: Final result Specimen: Blood from Arm, Left Updated: 02/15/21 2008     Iron Saturation 5 %      TIBC 467 ug/dL      Iron 25 ug/dL     Ferritin [[id number]]  (Abnormal) Collected: 02/15/21 1453    Lab Status: Final result Specimen: Blood from Arm, Left Updated: 02/15/21 2008     Ferritin 2 ng/mL     CBC and differential [[id number]]  (Abnormal) Collected: 02/15/21 1453    Lab Status: Final result Specimen: Blood from Arm, Left Updated: 02/15/21 1544     WBC 5 57 Thousand/uL      RBC 3 45 Million/uL      Hemoglobin 5 7 g/dL      Hematocrit 22 9 %      MCV 66 fL      MCH 16 5 pg      MCHC 24 9 g/dL      RDW 19 4 %      MPV 9 6 fL      Platelets 848 Thousands/uL      nRBC 0 /100 WBCs      Neutrophils Relative 56 %      Immat GRANS % 1 %      Lymphocytes Relative 34 %      Monocytes Relative 8 %      Eosinophils Relative 1 %      Basophils Relative 0 %      Neutrophils Absolute 3 16 Thousands/µL      Immature Grans Absolute 0 03 Thousand/uL      Lymphocytes Absolute 1 88 Thousands/µL      Monocytes Absolute 0 43 Thousand/µL      Eosinophils Absolute 0 06 Thousand/µL      Basophils Absolute 0 01 Thousands/µL Troponin I [[id number]]  (Normal) Collected: 02/15/21 1453    Lab Status: Final result Specimen: Blood from Arm, Left Updated: 02/15/21 1528     Troponin I <0 02 ng/mL     Basic metabolic panel [[id number]]  (Abnormal) Collected: 02/15/21 1453    Lab Status: Final result Specimen: Blood from Arm, Left Updated: 02/15/21 1523     Sodium 133 mmol/L      Potassium 3 1 mmol/L      Chloride 99 mmol/L      CO2 25 mmol/L      ANION GAP 9 mmol/L      BUN 7 mg/dL      Creatinine 0 67 mg/dL      Glucose 306 mg/dL      Calcium 8 4 mg/dL      eGFR 106 ml/min/1 73sq m     Narrative:      [place] guidelines for Chronic Kidney Disease (CKD):     Stage 1 with normal or high GFR (GFR > 90 mL/min/1 73 square meters)    Stage 2 Mild CKD (GFR = 60-89 mL/min/1 73 square meters)    Stage 3A Moderate CKD (GFR = 45-59 mL/min/1 73 square meters)    Stage 3B Moderate CKD (GFR = 30-44 mL/min/1 73 square meters)    Stage 4 Severe CKD (GFR = 15-29 mL/min/1 73 square meters)    Stage 5 End Stage CKD (GFR <15 mL/min/1 73 square meters)  Note: GFR calculation is accurate only with a steady state creatinine                 No orders to display              Procedures  ECG 12 Lead Documentation Only    Date/Time: 2/15/2021 3:04 PM  Performed by: Joseluis Laughlin DO  Authorized by: Joseluis Laughlin DO     ECG reviewed by me, the ED Provider: yes    Patient location:  ED  Previous ECG:     Previous ECG:  Unavailable  Interpretation:     Interpretation: non-specific    Rate:     ECG rate:  112    ECG rate assessment: tachycardic    Rhythm:     Rhythm: sinus rhythm    Ectopy:     Ectopy: none    QRS:     QRS axis:  Normal    QRS intervals:  Normal  Conduction:     Conduction: normal    ST segments:     ST segments:  Non-specific  T waves:     T waves: non-specific    CriticalCare Time  Performed by: Joseluis Laughlin DO  Authorized by: Joseluis Laughlin DO     Critical care provider statement:     Critical care time (minutes):  40    Critical care time was exclusive of:  Separately billable procedures and treating other patients    Critical care was necessary to treat or prevent imminent or life-threatening deterioration of the following conditions: Acute anemia, hemoglobin 5 7, requiring blood transfusion  Critical care was time spent personally by me on the following activities:  Obtaining history from patient or surrogate, development of treatment plan with patient or surrogate, discussions with consultants, evaluation of patient's response to treatment, examination of patient, ordering and performing treatments and interventions, ordering and review of laboratory studies, ordering and review of radiographic studies, re-evaluation of patient's condition and review of old charts             ED Course                             SBIRT 20yo+      Most Recent Value   SBIRT (24 yo +)   In order to provide better care to our patients, we are screening all of our patients for alcohol and drug use  Would it be okay to ask you these screening questions? Yes Filed at: 02/15/2021 1432   Initial Alcohol Screen: US AUDIT-C    1  How often do you have a drink containing alcohol?  0 Filed at: 02/15/2021 1432   2  How many drinks containing alcohol do you have on a typical day you are drinking? 0 Filed at: 02/15/2021 1432   3a  Male UNDER 65: How often do you have five or more drinks on one occasion? 0 Filed at: 02/15/2021 1432   3b  FEMALE Any Age, or MALE 65+: How often do you have 4 or more drinks on one occassion? 0 Filed at: 02/15/2021 1432   Audit-C Score  0 Filed at: 02/15/2021 1432   JALEEL: How many times in the past year have you    Used an illegal drug or used a prescription medication for non-medical reasons?   Never Filed at: 02/15/2021 1432                    MDM  Number of Diagnoses or Management Options  Acute blood loss anemia: new and requires workup  Generalized weakness: new and requires workup  Diagnosis management comments:       Initial ED assessment:  11-year-old female, increasing shortness of breath, outpatient lab work showing hemoglobin of 5 4, tachycardic here and short of breath with minimal exertion  Initial DDx includes but is not limited to: Anemia likely secondary to heavy periods, no history to suggest GI bleeding  No restrictive diet    Initial ED plan:   Blood work, likely transfuse, admit, check for end-organ injury        Final ED summary/disposition:   After evaluation and workup in the emergency department, found to be severely anemic, transfused in emergency department period, admitted to hospital for further workup evaluation           Amount and/or Complexity of Data Reviewed  Clinical lab tests: ordered and reviewed  Decide to obtain previous medical records or to obtain history from someone other than the patient: yes  Obtain history from someone other than the patient: yes  Review and summarize past medical records: yes  Independent visualization of images, tracings, or specimens: yes        Disposition  Final diagnoses:   Acute blood loss anemia   Generalized weakness     Time reflects when diagnosis was documented in both MDM as applicable and the Disposition within this note     Time User Action Codes Description Comment    2/15/2021  4:15 PM Preeti Wright J Add [D62] Acute blood loss anemia     2/15/2021  4:15 PM Ricardo Williamson Add [R53 1] Generalized weakness     2/17/2021 12:26 PM Karon Banks Modify [D62] Acute blood loss anemia     2/17/2021 12:26 PM Karon Banks Add [D62] Acute blood loss anemia w/ chronic iron deficiency anemia     2/17/2021 12:26 PM Karon Banks Add [M54 5,  G89 29] Chronic low back pain     2/17/2021 12:26 PM Karon Banks Add [I10] Essential hypertension     2/17/2021 12:26 PM Karon Banks Add [E11 65] Uncontrolled type 2 diabetes mellitus with hyperglycemia [facility])       ED Disposition     ED Disposition Condition Date/Time Comment Admit Stable Mon Feb 15, 2021  4:14 PM Case was discussed with Dr Stephen Acosta  and the patient's admission status was agreed to be Admission Status: observation status to the service of Dr Stephen Acosta   Follow-up Information     Follow up With Specialties Details Why Contact Info Additional [address] Internal Medicine [facility] Internal Medicine Call in 1 week(s)  [address] [facility] [address]  [address] [facility] Internal Medicine [facility], [address] [place], [place], [address] [phone]          Discharge Medication List as of 2/17/2021 12:51 PM      START taking these medications    Details   docusate sodium (COLACE) 100 mg capsule Take 1 capsule (100 mg total) by mouth 2 (two) times a day, Starting Wed 2/17/2021, Until Fri 3/19/2021, Normal      ferrous sulfate 324 (65 Fe) mg Take 1 tablet (324 mg total) by mouth daily before breakfast, Starting Wed 2/17/2021, Until Fri 3/19/2021, Normal         CONTINUE these medications which have NOT CHANGED    Details   buprenorphine-naloxone (SUBOXONE) 8-2 mg per SL tablet Place 1 tablet under the tongue 2 (two) times a day, Historical Med      lisinopril (ZESTRIL) 20 mg tablet Take 20 mg by mouth daily, Historical Med         STOP taking these medications       dicyclomine (BENTYL) 20 mg tablet Comments:   Reason for Stopping:         omeprazole (PriLOSEC) 20 mg delayed release capsule Comments:   Reason for Stopping:         ondansetron (ZOFRAN-ODT) 4 mg disintegrating tablet Comments:   Reason for Stopping:         sucralfate (CARAFATE) 1 g/10 mL suspension Comments:   Reason for Stopping:             Outpatient Discharge Orders   Ambulatory referral to Hematology / Oncology   Standing Status: Future Standing Exp  Date: 02/17/22      Ambulatory referral to Obstetrics / Gynecology   Standing Status: Future Standing Exp  Date: 02/17/22      Ambulatory referral to Internal Medicine   Standing Status: Future Standing Exp   Date: 02/17/22      Discharge Diet     Activity as tolerated       PDMP Review       Value Time User    PDMP Reviewed  Yes 2/17/2021 12:15 PM Beatriz Coulter DO          ED Provider  Electronically Signed by           [address] ,   02/15/21 1[address] [facility], DO  02/18/21 1114

## 2021-02-15 NOTE — Clinical Note
Case was discussed with Dr Liliam Freeman  and the patient's admission status was agreed to be Admission Status: observation status to the service of Dr Liliam Freeman

## 2021-02-15 NOTE — H&P
H&P- Maurice De La Rosa 1975, 54 y o  female MRN: [id number]    Unit/Bed#: ED 12 Encounter: [id number]    Primary Care Provider: Shakira Mcfarlane MD   Date and time admitted to hospital: 2/15/2021  2:04 PM    * Acute blood loss anemia  Assessment & Plan  · Patient presents w/ hgb of 5 7; about 3 years ago hgb was 8 5  · Patient notes chronic iron deficiency-- she has been having difficulty tolerating PO iron supplements  · Notes perimenopausal-- + mehorrhagia, about 1 5 mpnths ago-- not actively menstruating at this time  · Transfuse 2U PRBC   · F/U iron panel and anticipate starting Venofer tomorrow    Uncontrolled type 2 diabetes mellitus with hyperglycemia ([facility] [address] )  Assessment & Plan  No results found for: HGBA1C    No results for input(s): POCGLU in the last 72 hours  Blood Sugar Average: Last 72 hrs:  · BG on arrival 306  · Not on medications as an OP  · Check hgb a1c  · SSI for correction      Essential hypertension  Assessment & Plan  · BP low  · Lisinopril to be held     Chronic low back pain  Assessment & Plan  · Utilizes suboxone therapy  · Confirmed w/ PDMP: Suboxone 8-2 SL; note that Rx's over last few months have been either BID or TID dosing--- will order at BID dosing at this time      VTE Prophylaxis: Pharmacologic VTE Prophylaxis contraindicated due to low risk  / reason for no mechanical VTE prophylaxis low risk   Code Status: level 1  POLST: POLST form is not discussed and not completed at this time  Discussion with family:  at bedside     Anticipated Length of Stay:  Patient will be admitted on an Observation basis with an anticipated length of stay of  < 2 midnights  Justification for Hospital Stay: tx of anemia    Total Time for Visit, including Counseling / Coordination of Care: 30 minutes  Greater than 50% of this total time spent on direct patient counseling and coordination of care  Chief Complaint:   Low hemoglobin    History of Present Illness:    Maurice De La Rosa is a 54 y o  female past medical history significant for chronic iron deficiency anemia in common hypertension, chronic low back pain on Suboxone presents to the ED for evaluation of worsening fatigue and dyspnea  Patient reports about 1 and half months ago she had a menstrual cycle that was particularly heavy, with patient saturating 1 sanitary napkin per hour for approximately 1 and half weeks  After this episode, patient noted feeling fatigued  She was able to get some online lab work, including CBC and chemistries  Today, she got the results showing hemoglobin was 5 4  Today, patient had worsening dyspnea on exertion which prompted her ER visit  Patient denies any chest pain, shortness of breath at rest   She admits to pallor  She denies any blood in her stool/melena and/or hematuria  She was also seen by primary care physician, who recommended starting iron tablets  Unfortunately, patient had significant nausea with taking iron tablets  Patient notes that her periods have been irregular, with varying degrees of menstrual flow  Patient reports she has not seen OBGYN approximately 15 years since her daughter was born  Review of Systems:    Review of Systems   Constitutional: Positive for fatigue  HENT: Negative  Eyes: Negative  Respiratory: Positive for shortness of breath  Cardiovascular: Negative  Gastrointestinal: Negative  Genitourinary: Positive for menstrual problem  Musculoskeletal: Negative  Skin: Positive for pallor  Neurological: Negative  Hematological: Negative  Psychiatric/Behavioral: Negative  Past Medical and Surgical History:     Past Medical History:   Diagnosis Date    Diabetes mellitus ([address] )     Gastritis     Hypertension        Past Surgical History:   Procedure Laterality Date     SECTION         Meds/Allergies:    Prior to Admission medications    Medication Sig Start Date End Date Taking?  Authorizing Provider   buprenorphine-naloxone (SUBOXONE) 8-2 mg per SL tablet Place 1 tablet under the tongue 2 (two) times a day   Yes Historical Provider, MD   lisinopril (ZESTRIL) 20 mg tablet Take 20 mg by mouth daily   Yes Historical Provider, MD   dicyclomine (BENTYL) 20 mg tablet Take 1 tablet (20 mg total) by mouth 2 (two) times a day as needed (Abdominal pain/cramping)  Patient not taking: Reported on 2/15/2021 8/9/18   Ricardo Solis DO   omeprazole (PriLOSEC) 20 mg delayed release capsule Take 1 capsule (20 mg total) by mouth daily for 14 days  Patient not taking: Reported on 2/15/2021 8/9/18 2/15/21  Ricardo Young DO   ondansetron (ZOFRAN-ODT) 4 mg disintegrating tablet Take 1 tablet (4 mg total) by mouth every 8 (eight) hours as needed for nausea or vomiting for up to 15 doses  Patient not taking: Reported on 2/15/2021 8/9/18   Ricardo Young DO   sucralfate (CARAFATE) 1 g/10 mL suspension Take 10 mL (1 g total) by mouth 2 (two) times a day for 30 days  Patient not taking: Reported on 2/15/2021 8/9/18 2/15/21  Rama Ramos DO     I have reviewed home medications with patient personally  Allergies: Allergies   Allergen Reactions    Ketorolac Tromethamine GI Intolerance    Penicillins     Sulfa Antibiotics Hives       Social History:     Marital Status: /Civil Union   Occupation:   Patient Pre-hospital Living Situation: home w/ family  Patient Pre-hospital Level of Mobility: independent  Patient Pre-hospital Diet Restrictions: none  Substance Use History:   Social History     Substance and Sexual Activity   Alcohol Use Yes    Comment: socially     Social History     Tobacco Use   Smoking Status Current Some Day Smoker   Smokeless Tobacco Never Used     Social History     Substance and Sexual Activity   Drug Use No       Family History:    History reviewed  No pertinent family history      Physical Exam:     Vitals:   Blood Pressure: 129/72 (02/15/21 1800)  Pulse: 84 (02/15/21 1800)  Temperature: 98 2 °F (36 8 °C) (02/15/21 1700)  Temp Source: Oral (02/15/21 1700)  Respirations: 16 (02/15/21 1700)  SpO2: 96 % (02/15/21 1800)    Physical Exam  Constitutional:       Appearance: Normal appearance  She is obese  She is not ill-appearing  HENT:      Head: Normocephalic and atraumatic  Mouth/Throat:      Mouth: Mucous membranes are moist    Eyes:      Pupils: Pupils are equal, round, and reactive to light  Cardiovascular:      Rate and Rhythm: Regular rhythm  Tachycardia present  Heart sounds: No murmur  No friction rub  No gallop  Pulmonary:      Effort: Pulmonary effort is normal       Breath sounds: Normal breath sounds  No wheezing  Abdominal:      General: Abdomen is flat  Palpations: Abdomen is soft  Tenderness: There is no abdominal tenderness  Skin:     General: Skin is warm and dry  Coloration: Skin is pale  Neurological:      General: No focal deficit present  Mental Status: She is alert and oriented to person, place, and time  Psychiatric:         Mood and Affect: Mood normal        Additional Data:     Lab Results: I have personally reviewed pertinent reports  Results from last 7 days   Lab Units 02/15/21  1453   WBC Thousand/uL 5 57   HEMOGLOBIN g/dL 5 7*   HEMATOCRIT % 22 9*   PLATELETS Thousands/uL 217   NEUTROS PCT % 56   LYMPHS PCT % 34   MONOS PCT % 8   EOS PCT % 1     Results from last 7 days   Lab Units 02/15/21  1453   SODIUM mmol/L 133*   POTASSIUM mmol/L 3 1*   CHLORIDE mmol/L 99*   CO2 mmol/L 25   BUN mg/dL 7   CREATININE mg/dL 0 67   ANION GAP mmol/L 9   CALCIUM mg/dL 8 4   GLUCOSE RANDOM mg/dL 306*                       Imaging: I have personally reviewed pertinent reports  No orders to display       EKG, Pathology, and Other Studies Reviewed on Admission:   · EKG: sinus tach  w/ no ST segment changes    Allscripts / Epic Records Reviewed: Yes     ** Please Note: This note has been constructed using a voice recognition system   **

## 2021-02-15 NOTE — ASSESSMENT & PLAN NOTE
No results found for: HGBA1C    No results for input(s): POCGLU in the last 72 hours      Blood Sugar Average: Last 72 hrs:  · BG on arrival 306  · Not on medications as an OP  · Check hgb a1c  · SSI for correction

## 2021-02-15 NOTE — ED NOTES
ABO Rh recheck drawn from left wrist  First ABO Rh drawn from left AC        Ismael Marino, MICHAEL  02/15/21 8927

## 2021-02-15 NOTE — ASSESSMENT & PLAN NOTE
· Patient presents w/ hgb of 5 7; about 3 years ago hgb was 8 5  · Patient notes chronic iron deficiency-- she has been having difficulty tolerating PO iron supplements  · Notes perimenopausal-- + mehorrhagia, about 2 weeks ago-- not actively menstruating   · Transfuse 2U PRBC   · F/U iron panel and anticipate starting Venofer tomorrow

## 2021-02-16 LAB
ABO GROUP BLD BPU: NORMAL
ABO GROUP BLD BPU: NORMAL
ABO GROUP BLD: NORMAL
ALBUMIN SERPL BCP-MCNC: 3.1 G/DL (ref 3.5–5)
ALP SERPL-CCNC: 69 U/L (ref 46–116)
ALT SERPL W P-5'-P-CCNC: 26 U/L (ref 12–78)
ANION GAP SERPL CALCULATED.3IONS-SCNC: 10 MMOL/L (ref 4–13)
AST SERPL W P-5'-P-CCNC: 23 U/L (ref 5–45)
ATRIAL RATE: 112 BPM
BASOPHILS # BLD AUTO: 0.02 THOUSANDS/ΜL (ref 0–0.1)
BASOPHILS NFR BLD AUTO: 0 % (ref 0–1)
BILIRUB SERPL-MCNC: 0.57 MG/DL (ref 0.2–1)
BPU ID: NORMAL
BPU ID: NORMAL
BUN SERPL-MCNC: 8 MG/DL (ref 5–25)
CALCIUM ALBUM COR SERPL-MCNC: 9.2 MG/DL (ref 8.3–10.1)
CALCIUM SERPL-MCNC: 8.5 MG/DL (ref 8.3–10.1)
CHLORIDE SERPL-SCNC: 102 MMOL/L (ref 100–108)
CO2 SERPL-SCNC: 25 MMOL/L (ref 21–32)
CREAT SERPL-MCNC: 0.67 MG/DL (ref 0.6–1.3)
CROSSMATCH: NORMAL
CROSSMATCH: NORMAL
EOSINOPHIL # BLD AUTO: 0.1 THOUSAND/ΜL (ref 0–0.61)
EOSINOPHIL NFR BLD AUTO: 2 % (ref 0–6)
ERYTHROCYTE [DISTWIDTH] IN BLOOD BY AUTOMATED COUNT: 22.2 % (ref 11.6–15.1)
EST. AVERAGE GLUCOSE BLD GHB EST-MCNC: 194 MG/DL
GFR SERPL CREATININE-BSD FRML MDRD: 106 ML/MIN/1.73SQ M
GLUCOSE SERPL-MCNC: 220 MG/DL (ref 65–140)
HBA1C MFR BLD: 8.4 %
HCT VFR BLD AUTO: 27.7 % (ref 34.8–46.1)
HGB BLD-MCNC: 7.4 G/DL (ref 11.5–15.4)
IMM GRANULOCYTES # BLD AUTO: 0.01 THOUSAND/UL (ref 0–0.2)
IMM GRANULOCYTES NFR BLD AUTO: 0 % (ref 0–2)
LYMPHOCYTES # BLD AUTO: 3.03 THOUSANDS/ΜL (ref 0.6–4.47)
LYMPHOCYTES NFR BLD AUTO: 50 % (ref 14–44)
MCH RBC QN AUTO: 18.8 PG (ref 26.8–34.3)
MCHC RBC AUTO-ENTMCNC: 26.7 G/DL (ref 31.4–37.4)
MCV RBC AUTO: 71 FL (ref 82–98)
MONOCYTES # BLD AUTO: 0.45 THOUSAND/ΜL (ref 0.17–1.22)
MONOCYTES NFR BLD AUTO: 7 % (ref 4–12)
NEUTROPHILS # BLD AUTO: 2.52 THOUSANDS/ΜL (ref 1.85–7.62)
NEUTS SEG NFR BLD AUTO: 41 % (ref 43–75)
NRBC BLD AUTO-RTO: 0 /100 WBCS
P AXIS: 50 DEGREES
PLATELET # BLD AUTO: 187 THOUSANDS/UL (ref 149–390)
PMV BLD AUTO: 9.5 FL (ref 8.9–12.7)
POTASSIUM SERPL-SCNC: 3.3 MMOL/L (ref 3.5–5.3)
PR INTERVAL: 130 MS
PROT SERPL-MCNC: 7.4 G/DL (ref 6.4–8.2)
QRS AXIS: 23 DEGREES
QRSD INTERVAL: 76 MS
QT INTERVAL: 316 MS
QTC INTERVAL: 431 MS
RBC # BLD AUTO: 3.93 MILLION/UL (ref 3.81–5.12)
RH BLD: POSITIVE
SODIUM SERPL-SCNC: 137 MMOL/L (ref 136–145)
T WAVE AXIS: 62 DEGREES
UNIT DISPENSE STATUS: NORMAL
UNIT DISPENSE STATUS: NORMAL
UNIT PRODUCT CODE: NORMAL
UNIT PRODUCT CODE: NORMAL
UNIT RH: NORMAL
UNIT RH: NORMAL
VENTRICULAR RATE: 112 BPM
WBC # BLD AUTO: 6.13 THOUSAND/UL (ref 4.31–10.16)

## 2021-02-16 PROCEDURE — 36415 COLL VENOUS BLD VENIPUNCTURE: CPT | Performed by: PHYSICIAN ASSISTANT

## 2021-02-16 PROCEDURE — 93010 ELECTROCARDIOGRAM REPORT: CPT | Performed by: INTERNAL MEDICINE

## 2021-02-16 PROCEDURE — 80053 COMPREHEN METABOLIC PANEL: CPT | Performed by: PHYSICIAN ASSISTANT

## 2021-02-16 PROCEDURE — 85025 COMPLETE CBC W/AUTO DIFF WBC: CPT | Performed by: PHYSICIAN ASSISTANT

## 2021-02-16 PROCEDURE — 99225 PR SBSQ OBSERVATION CARE/DAY 25 MINUTES: CPT | Performed by: PHYSICIAN ASSISTANT

## 2021-02-16 RX ORDER — SIMETHICONE 80 MG
80 TABLET,CHEWABLE ORAL EVERY 6 HOURS PRN
Status: DISCONTINUED | OUTPATIENT
Start: 2021-02-16 | End: 2021-02-17 | Stop reason: HOSPADM

## 2021-02-16 RX ORDER — POTASSIUM CHLORIDE 20 MEQ/1
40 TABLET, EXTENDED RELEASE ORAL ONCE
Status: COMPLETED | OUTPATIENT
Start: 2021-02-16 | End: 2021-02-16

## 2021-02-16 RX ADMIN — POTASSIUM CHLORIDE 40 MEQ: 1500 TABLET, EXTENDED RELEASE ORAL at 13:52

## 2021-02-16 RX ADMIN — BUPRENORPHINE AND NALOXONE 1 FILM: 8; 2 FILM BUCCAL; SUBLINGUAL at 09:34

## 2021-02-16 RX ADMIN — DOCUSATE SODIUM 100 MG: 100 CAPSULE, LIQUID FILLED ORAL at 18:24

## 2021-02-16 RX ADMIN — SIMETHICONE 80 MG: 80 TABLET, CHEWABLE ORAL at 20:07

## 2021-02-16 RX ADMIN — IRON SUCROSE 300 MG: 20 INJECTION, SOLUTION INTRAVENOUS at 11:59

## 2021-02-16 RX ADMIN — BUPRENORPHINE AND NALOXONE 1 FILM: 8; 2 FILM BUCCAL; SUBLINGUAL at 20:07

## 2021-02-16 NOTE — UTILIZATION REVIEW
Initial Clinical Review    Admission: Date/Time/Statement:   Admission Orders (From admission, onward)     Ordered        02/15/21 1614  Place in Observation  Once                   Orders Placed This Encounter   Procedures    Place in Observation     Standing Status:   Standing     Number of Occurrences:   1     Order Specific Question:   Level of Care     Answer:   Med Surg [16]     ED Arrival Information     Expected Arrival Acuity Means of Arrival Escorted By Service Admission Type    - 2/15/2021 13:57 Urgent Walk-In Self Hospitalist Urgent    Arrival Complaint    Weakness        Chief Complaint   Patient presents with    Shortness of Breath     pt had low hgb a few weeks ago was told to take iron does not feel like it is helping and iron makes her sick     Assessment/Plan: this is a 54year old female from home to ED admitted to observation due to acute blood loss anemia in setting of iron deficiency anemia/ diabetes with recent start of metformin  Presented due to worsening weakness and shortness of breath starting about 2 weeks ago  Recently started on iron pill and unable to tolerate, has heavy menstrual cycles, last about 2 weeks ago  On exam tachycardic and pale  H&H 5 7/22 9 with baseline hgb of 8 5 about 3 years ago  TIBC 467  Iron 25  Ferritin 2   K 3 1  Glucose 306  Plan is blood transfusion, IV iron, monitor H&H   accuchecks qid with SSI      2/16/2021:   Observation continues: Patient is perimenopausal and has menorrhagia with on current bleeding, history of iron deficiency anemia and unable to tolerate oral iron  Patient fatigued  Breathing improved  H&H 7 4/27 7 post 2 units of PRBC;  low ferritin, high TIBC, low total iron and venofer started    Continue to monitor H&H      ED Triage Vitals   Temperature Pulse Respirations Blood Pressure SpO2   02/15/21 1403 02/15/21 1403 02/15/21 1421 02/15/21 1403 02/15/21 1403   98 °F (36 7 °C) (!) 115 18 140/63 98 %      Temp Source Heart Rate Source Patient Position - Orthostatic VS BP Location FiO2 (%)   02/15/21 1421 02/15/21 1421 02/15/21 1403 02/15/21 1403 --   Oral Monitor Sitting Left arm       Pain Score       02/15/21 1403       No Pain          Wt Readings from Last 1 Encounters:   02/15/21 85 3 kg (188 lb)     Additional Vital Signs:   02/16/21 0803  98 9 °F (37 2 °C)  73  18  92/50  67  95 %  None (Room air)  Lying   02/16/21 0547  98 4 °F (36 9 °C)  78  16  128/75  --  96 %  None (Room air)         02/15/21 2124  99 °F (37 2 °C)  80  18  124/69  --  96 %  None (Room air)       Pertinent Labs/Diagnostic Test Results:   2/15/2021 EKG - sinus    Non specific ST and T    Results from last 7 days   Lab Units 02/16/21  0509 02/15/21  1453   WBC Thousand/uL 6 13 5 57   HEMOGLOBIN g/dL 7 4* 5 7*   HEMATOCRIT % 27 7* 22 9*   PLATELETS Thousands/uL 187 217   NEUTROS ABS Thousands/µL 2 52 3 16     Results from last 7 days   Lab Units 02/16/21  0509 02/15/21  1453   SODIUM mmol/L 137 133*   POTASSIUM mmol/L 3 3* 3 1*   CHLORIDE mmol/L 102 99*   CO2 mmol/L 25 25   ANION GAP mmol/L 10 9   BUN mg/dL 8 7   CREATININE mg/dL 0 67 0 67   EGFR ml/min/1 73sq m 106 106   CALCIUM mg/dL 8 5 8 4     Results from last 7 days   Lab Units 02/16/21  0509   AST U/L 23   ALT U/L 26   ALK PHOS U/L 69   TOTAL PROTEIN g/dL 7 4   ALBUMIN g/dL 3 1*   TOTAL BILIRUBIN mg/dL 0 57     Results from last 7 days   Lab Units 02/16/21  0509 02/15/21  1453   GLUCOSE RANDOM mg/dL 220* 306*     Results from last 7 days   Lab Units 02/15/21  1453   HEMOGLOBIN A1C % 8 4*   EAG mg/dl 194     Results from last 7 days   Lab Units 02/15/21  1453   TROPONIN I ng/mL <0 02     Results from last 7 days   Lab Units 02/15/21  1453   FERRITIN ng/mL 2*     ED Treatment:   Medication Administration from 02/15/2021 1356 to 02/16/2021 0539       Date/Time Order Dose Route Action Comments     02/15/2021 2024 buprenorphine-naloxone (SUBOXONE) 8-2 mg per SL film 1 Film 1 Film Sublingual Not Given 02/15/2021 2021 acetaminophen (TYLENOL) tablet 650 mg 650 mg Oral Given      02/15/2021 2021 diphenhydrAMINE (BENADRYL) tablet 25 mg 25 mg Oral Given         Past Medical History:   Diagnosis Date    Diabetes mellitus ([facility] [address] )     Gastritis     Hypertension      Present on Admission:  **None**      Admitting Diagnosis: Acute blood loss anemia [D62]  Weakness [R53 1]  Generalized weakness [R53 1]  Age/Sex: 54 y o  female  Admission Orders: 2/15/2021 1614 observation   Scheduled Medications:  buprenorphine-naloxone, 1 Film, Sublingual, BID  docusate sodium, 100 mg, Oral, BID  nicotine, 1 patch, Transdermal, Daily    iron sucrose (VENOFER) 300 mg in sodium chloride 0 9 % 250 mL IVPB   Dose: 300 mg  Freq: Daily Route: IV  Last Dose: 300 mg (02/16/21 1159)  Start: 02/16/21 1015 End: 02/19/21 0859    Continuous IV Infusions: none     PRN Meds:  acetaminophen, 650 mg, Oral, Q6H PRN - used x 1   calcium carbonate, 1,000 mg, Oral, Daily PRN  diphenhydrAMINE, 25 mg, Oral, HS PRN - used x 1   ondansetron, 4 mg, Intravenous, Q6H PRN    2/15/2021:  Transfuse 2 units PRBC      Network Utilization Review Department  ATTENTION: Please call with any questions or concerns to [phone] and carefully listen to the prompts so that you are directed to the right person  All voicemails are confidential   Margarita Simental all requests for admission clinical reviews, approved or denied determinations and any other requests to dedicated fax number below belonging to the campus where the patient is receiving treatment   List of dedicated fax numbers for the Facilities:  [address] DENIALS (Administrative/Medical Necessity) [phone]   [address] (Maternity/NICU/Pediatrics) 261 [facility] [address] [place] [facility] [address] [address] ([address]) [address] [place] [address] [address] [phone]   [address] [phone]

## 2021-02-16 NOTE — ASSESSMENT & PLAN NOTE
· Utilizes suboxone therapy  · Confirmed w/ PDMP: Suboxone 8-2 SL-- last few Rx have been BID or TID-- ordered at BID this admission

## 2021-02-16 NOTE — PLAN OF CARE
Problem: PAIN - ADULT  Goal: Verbalizes/displays adequate comfort level or baseline comfort level  Description: Interventions:  - Encourage patient to monitor pain and request assistance  - Assess pain using appropriate pain scale  - Administer analgesics based on type and severity of pain and evaluate response  - Implement non-pharmacological measures as appropriate and evaluate response  - Consider cultural and social influences on pain and pain management  - Notify physician/advanced practitioner if interventions unsuccessful or patient reports new pain  Outcome: Progressing     Problem: SAFETY ADULT  Goal: Patient will remain free of falls  Description: INTERVENTIONS:  - Assess patient frequently for physical needs  -  Identify cognitive and physical deficits and behaviors that affect risk of falls    -  [place] fall precautions as indicated by assessment   - Educate patient/family on patient safety including physical limitations  - Instruct patient to call for assistance with activity based on assessment  - Modify environment to reduce risk of injury  - Consider OT/PT consult to assist with strengthening/mobility  Outcome: Progressing  Goal: Maintain or return to baseline ADL function  Description: INTERVENTIONS:  -  Assess patient's ability to carry out ADLs; assess patient's baseline for ADL function and identify physical deficits which impact ability to perform ADLs (bathing, care of mouth/teeth, toileting, grooming, dressing, etc )  - Assess/evaluate cause of self-care deficits   - Assess range of motion  - Assess patient's mobility; develop plan if impaired  - Assess patient's need for assistive devices and provide as appropriate  - Encourage maximum independence but intervene and supervise when necessary  - Involve family in performance of ADLs  - Assess for home care needs following discharge   - Consider OT consult to assist with ADL evaluation and planning for discharge  - Provide patient education as appropriate  Outcome: Progressing  Goal: Maintain or return mobility status to optimal level  Description: INTERVENTIONS:  - Assess patient's baseline mobility status (ambulation, transfers, stairs, etc )    - Identify cognitive and physical deficits and behaviors that affect mobility  - Identify mobility aids required to assist with transfers and/or ambulation (gait belt, sit-to-stand, lift, walker, cane, etc )  - [place] fall precautions as indicated by assessment  - Record patient progress and toleration of activity level on Mobility SBAR; progress patient to next Phase/Stage  - Instruct patient to call for assistance with activity based on assessment  - Consider rehabilitation consult to assist with strengthening/weightbearing, etc   Outcome: Progressing     Problem: DISCHARGE PLANNING  Goal: Discharge to home or other facility with appropriate resources  Description: INTERVENTIONS:  - Identify barriers to discharge w/patient and caregiver  - Arrange for needed discharge resources and transportation as appropriate  - Identify discharge learning needs (meds, wound care, etc )  - Arrange for interpretive services to assist at discharge as needed  - Refer to Case Management Department for coordinating discharge planning if the patient needs post-hospital services based on physician/advanced practitioner order or complex needs related to functional status, cognitive ability, or social support system  Outcome: Progressing     Problem: Knowledge Deficit  Goal: Patient/family/caregiver demonstrates understanding of disease process, treatment plan, medications, and discharge instructions  Description: Complete learning assessment and assess knowledge base    Interventions:  - Provide teaching at level of understanding  - Provide teaching via preferred learning methods  Outcome: Progressing     Problem: HEMATOLOGIC - ADULT  Goal: Maintains hematologic stability  Description: INTERVENTIONS  - Assess for signs and symptoms of bleeding or hemorrhage  - Monitor labs  - Administer supportive blood products/factors as ordered and appropriate  Outcome: Progressing

## 2021-02-16 NOTE — ASSESSMENT & PLAN NOTE
· Patient presents w/ hgb of 5 7; about 3 years ago hgb was 8 5  · Patient notes chronic iron deficiency-- she has been having difficulty tolerating PO iron supplements  · Notes perimenopausal-- + mehorrhagia, about 1 5 months ago-- not actively menstruating today   · S/P 2U PRBC yesterday w/ increase in hgb to 7 4  · Reviewed iron panel-- low ferritin, high TIBC, low total iron   · Start venofer 300 mg IV QD

## 2021-02-16 NOTE — PROGRESS NOTES
Progress Note - Verna Mcdaniel 1975, 54 y o  female MRN: [id number]    Unit/Bed#: S -01 Encounter: [id number]    Primary Care Provider: Hailey Ewing MD   Date and time admitted to hospital: 2/15/2021  2:04 PM    * Acute blood loss anemia w/ chronic iron deficiency anemia  Assessment & Plan  · Patient presents w/ hgb of 5 7; about 3 years ago hgb was 8 5  · Patient notes chronic iron deficiency-- she has been having difficulty tolerating PO iron supplements  · Notes perimenopausal-- + mehorrhagia, about 1 5 months ago-- not actively menstruating today   · S/P 2U PRBC yesterday w/ increase in hgb to 7 4  · Reviewed iron panel-- low ferritin, high TIBC, low total iron   · Start venofer 300 mg IV QD    Uncontrolled type 2 diabetes mellitus with hyperglycemia (HCC)  Assessment & Plan  Lab Results   Component Value Date    HGBA1C 8 4 (H) 02/15/2021       No results for input(s): POCGLU in the last 72 hours  Blood Sugar Average: Last 72 hrs:  · BG on arrival 306  · hgb a1c 8 4%  · Just recently started on metformin as an OP-- recommend resumption on d/c w/ lifestyle and dietary modifications  · SSI for correction here in hospital      Essential hypertension  Assessment & Plan  · BP low  · Lisinopril to be held     Chronic low back pain  Assessment & Plan  · Utilizes suboxone therapy  · Confirmed w/ PDMP: Suboxone 8-2 SL-- last few Rx have been BID or TID-- ordered at BID this admission      VTE Pharmacologic Prophylaxis:   Pharmacologic: Pharmacologic VTE Prophylaxis contraindicated due to low risk  Mechanical VTE Prophylaxis in Place: No    Patient Centered Rounds: I have performed bedside rounds with nursing staff today  Discussions with Specialists or Other Care Team Provider: JAREK, RN     Education and Discussions with Family / Patient: patient;  updated via phone    Time Spent for Care: 30 minutes    More than 50% of total time spent on counseling and coordination of care as described above     Current Length of Stay: 0 day(s)    Current Patient Status: Observation   Certification Statement: The patient, admitted on an observation basis, will now require > 2 midnight hospital stay due to ongoing tx of iron deficiency anemia requiring IV iron infusion 2/2 intolerance of PO iron supplements    Discharge Plan: likely d/c to home pending stability of hgb     Code Status: Level 1 - Full Code      Subjective:   Patient is feeling somewhat better  Reports fatigue, but breathing has improved  Objective:     Vitals:   Temp (24hrs), Av 5 °F (36 9 °C), Min:98 °F (36 7 °C), Max:99 °F (37 2 °C)    Temp:  [98 °F (36 7 °C)-99 °F (37 2 °C)] 98 9 °F (37 2 °C)  HR:  [] 73  Resp:  [16-18] 18  BP: ()/(50-78) 92/50  SpO2:  [95 %-99 %] 95 %  Body mass index is 31 28 kg/m²  Input and Output Summary (last 24 hours): Intake/Output Summary (Last 24 hours) at 2021 1101  Last data filed at 2021 0803  Gross per 24 hour   Intake 950 ml   Output --   Net 950 ml       Physical Exam:     Physical Exam  Constitutional:       Appearance: Normal appearance  HENT:      Head: Normocephalic and atraumatic  Mouth/Throat:      Mouth: Mucous membranes are moist    Eyes:      Pupils: Pupils are equal, round, and reactive to light  Cardiovascular:      Rate and Rhythm: Normal rate and regular rhythm  Heart sounds: No murmur  No friction rub  No gallop  Pulmonary:      Effort: Pulmonary effort is normal       Breath sounds: Normal breath sounds  No wheezing  Abdominal:      General: Abdomen is flat  Palpations: Abdomen is soft  Tenderness: There is no abdominal tenderness  Musculoskeletal: Normal range of motion  General: No swelling  Skin:     Coloration: Skin is pale  Neurological:      General: No focal deficit present  Mental Status: She is alert and oriented to person, place, and time     Psychiatric:         Mood and Affect: Mood normal  Behavior: Behavior normal        Additional Data:     Labs:    Results from last 7 days   Lab Units 02/16/21  0509   WBC Thousand/uL 6 13   HEMOGLOBIN g/dL 7 4*   HEMATOCRIT % 27 7*   PLATELETS Thousands/uL 187   NEUTROS PCT % 41*   LYMPHS PCT % 50*   MONOS PCT % 7   EOS PCT % 2     Results from last 7 days   Lab Units 02/16/21  0509   SODIUM mmol/L 137   POTASSIUM mmol/L 3 3*   CHLORIDE mmol/L 102   CO2 mmol/L 25   BUN mg/dL 8   CREATININE mg/dL 0 67   ANION GAP mmol/L 10   CALCIUM mg/dL 8 5   ALBUMIN g/dL 3 1*   TOTAL BILIRUBIN mg/dL 0 57   ALK PHOS U/L 69   ALT U/L 26   AST U/L 23   GLUCOSE RANDOM mg/dL 220*             Results from last 7 days   Lab Units 02/15/21  1453   HEMOGLOBIN A1C % 8 4*               * I Have Reviewed All Lab Data Listed Above  * Additional Pertinent Lab Tests Reviewed: Anant 66 Admission Reviewed    Imaging:    Imaging Reports Reviewed Today Include: none  Imaging Personally Reviewed by Myself Includes:  none    Recent Cultures (last 7 days):           Last 24 Hours Medication List:   Current Facility-Administered Medications   Medication Dose Route Frequency Provider Last Rate    acetaminophen  650 mg Oral Q6H PRN Luisa Winter PA-C      buprenorphine-naloxone  1 Film Sublingual BID Luisa Winter PA-C      calcium carbonate  1,000 mg Oral Daily PRN Luisa Winter PA-C      diphenhydrAMINE  25 mg Oral HS PRN Luisa Winter PA-C      docusate sodium  100 mg Oral BID Luisa Winter PA-C      iron sucrose  300 mg Intravenous Daily Luisa Winter PA-C      nicotine  1 patch Transdermal Daily Luisa Winter PA-C      ondansetron  4 mg Intravenous Q6H PRN Luisa Winter PA-C          Today, Patient Was Seen By: Viki Rodriguez PA-C    ** Please Note: Dictation voice to text software may have been used in the creation of this document   **

## 2021-02-16 NOTE — ASSESSMENT & PLAN NOTE
Lab Results   Component Value Date    HGBA1C 8 4 (H) 02/15/2021       No results for input(s): POCGLU in the last 72 hours      Blood Sugar Average: Last 72 hrs:  · BG on arrival 306  · hgb a1c 8 4%  · Just recently started on metformin as an OP-- recommend resumption on d/c w/ lifestyle and dietary modifications  · SSI for correction here in hospital

## 2021-02-16 NOTE — PLAN OF CARE
Problem: PAIN - ADULT  Goal: Verbalizes/displays adequate comfort level or baseline comfort level  Description: Interventions:  - Encourage patient to monitor pain and request assistance  - Assess pain using appropriate pain scale  - Administer analgesics based on type and severity of pain and evaluate response  - Implement non-pharmacological measures as appropriate and evaluate response  - Consider cultural and social influences on pain and pain management  - Notify physician/advanced practitioner if interventions unsuccessful or patient reports new pain  Outcome: Progressing     Problem: SAFETY ADULT  Goal: Patient will remain free of falls  Description: INTERVENTIONS:  - Assess patient frequently for physical needs  -  Identify cognitive and physical deficits and behaviors that affect risk of falls    -  [place] fall precautions as indicated by assessment   - Educate patient/family on patient safety including physical limitations  - Instruct patient to call for assistance with activity based on assessment  - Modify environment to reduce risk of injury  - Consider OT/PT consult to assist with strengthening/mobility  Outcome: Progressing  Goal: Maintain or return to baseline ADL function  Description: INTERVENTIONS:  -  Assess patient's ability to carry out ADLs; assess patient's baseline for ADL function and identify physical deficits which impact ability to perform ADLs (bathing, care of mouth/teeth, toileting, grooming, dressing, etc )  - Assess/evaluate cause of self-care deficits   - Assess range of motion  - Assess patient's mobility; develop plan if impaired  - Assess patient's need for assistive devices and provide as appropriate  - Encourage maximum independence but intervene and supervise when necessary  - Involve family in performance of ADLs  - Assess for home care needs following discharge   - Consider OT consult to assist with ADL evaluation and planning for discharge  - Provide patient education as appropriate  Outcome: Progressing  Goal: Maintain or return mobility status to optimal level  Description: INTERVENTIONS:  - Assess patient's baseline mobility status (ambulation, transfers, stairs, etc )    - Identify cognitive and physical deficits and behaviors that affect mobility  - Identify mobility aids required to assist with transfers and/or ambulation (gait belt, sit-to-stand, lift, walker, cane, etc )  - [place] fall precautions as indicated by assessment  - Record patient progress and toleration of activity level on Mobility SBAR; progress patient to next Phase/Stage  - Instruct patient to call for assistance with activity based on assessment  - Consider rehabilitation consult to assist with strengthening/weightbearing, etc   Outcome: Progressing     Problem: DISCHARGE PLANNING  Goal: Discharge to home or other facility with appropriate resources  Description: INTERVENTIONS:  - Identify barriers to discharge w/patient and caregiver  - Arrange for needed discharge resources and transportation as appropriate  - Identify discharge learning needs (meds, wound care, etc )  - Arrange for interpretive services to assist at discharge as needed  - Refer to Case Management Department for coordinating discharge planning if the patient needs post-hospital services based on physician/advanced practitioner order or complex needs related to functional status, cognitive ability, or social support system  Outcome: Progressing     Problem: Knowledge Deficit  Goal: Patient/family/caregiver demonstrates understanding of disease process, treatment plan, medications, and discharge instructions  Description: Complete learning assessment and assess knowledge base    Interventions:  - Provide teaching at level of understanding  - Provide teaching via preferred learning methods  Outcome: Progressing     Problem: HEMATOLOGIC - ADULT  Goal: Maintains hematologic stability  Description: INTERVENTIONS  - Assess for signs and symptoms of bleeding or hemorrhage  - Monitor labs  - Administer supportive blood products/factors as ordered and appropriate  Outcome: Progressing

## 2021-02-17 VITALS
RESPIRATION RATE: 18 BRPM | SYSTOLIC BLOOD PRESSURE: 126 MMHG | TEMPERATURE: 98.2 F | HEART RATE: 70 BPM | WEIGHT: 188 LBS | DIASTOLIC BLOOD PRESSURE: 60 MMHG | BODY MASS INDEX: 31.32 KG/M2 | OXYGEN SATURATION: 95 % | HEIGHT: 65 IN

## 2021-02-17 LAB
ANION GAP SERPL CALCULATED.3IONS-SCNC: 9 MMOL/L (ref 4–13)
BUN SERPL-MCNC: 7 MG/DL (ref 5–25)
CALCIUM SERPL-MCNC: 8.6 MG/DL (ref 8.3–10.1)
CHLORIDE SERPL-SCNC: 104 MMOL/L (ref 100–108)
CO2 SERPL-SCNC: 24 MMOL/L (ref 21–32)
CREAT SERPL-MCNC: 0.55 MG/DL (ref 0.6–1.3)
ERYTHROCYTE [DISTWIDTH] IN BLOOD BY AUTOMATED COUNT: 22.7 % (ref 11.6–15.1)
GFR SERPL CREATININE-BSD FRML MDRD: 113 ML/MIN/1.73SQ M
GLUCOSE SERPL-MCNC: 255 MG/DL (ref 65–140)
HCT VFR BLD AUTO: 28 % (ref 34.8–46.1)
HGB BLD-MCNC: 7.3 G/DL (ref 11.5–15.4)
MCH RBC QN AUTO: 18.7 PG (ref 26.8–34.3)
MCHC RBC AUTO-ENTMCNC: 26.1 G/DL (ref 31.4–37.4)
MCV RBC AUTO: 72 FL (ref 82–98)
PLATELET # BLD AUTO: 158 THOUSANDS/UL (ref 149–390)
PMV BLD AUTO: 9.7 FL (ref 8.9–12.7)
POTASSIUM SERPL-SCNC: 3.4 MMOL/L (ref 3.5–5.3)
RBC # BLD AUTO: 3.91 MILLION/UL (ref 3.81–5.12)
SODIUM SERPL-SCNC: 137 MMOL/L (ref 136–145)
WBC # BLD AUTO: 4.94 THOUSAND/UL (ref 4.31–10.16)

## 2021-02-17 PROCEDURE — 99217 PR OBSERVATION CARE DISCHARGE MANAGEMENT: CPT | Performed by: INTERNAL MEDICINE

## 2021-02-17 PROCEDURE — 85027 COMPLETE CBC AUTOMATED: CPT | Performed by: PHYSICIAN ASSISTANT

## 2021-02-17 PROCEDURE — 80048 BASIC METABOLIC PNL TOTAL CA: CPT | Performed by: PHYSICIAN ASSISTANT

## 2021-02-17 RX ORDER — DOCUSATE SODIUM 100 MG/1
100 CAPSULE, LIQUID FILLED ORAL 2 TIMES DAILY
Qty: 60 CAPSULE | Refills: 0 | Status: SHIPPED | OUTPATIENT
Start: 2021-02-17 | End: 2021-03-19

## 2021-02-17 RX ORDER — POTASSIUM CHLORIDE 20 MEQ/1
40 TABLET, EXTENDED RELEASE ORAL ONCE
Status: DISCONTINUED | OUTPATIENT
Start: 2021-02-17 | End: 2021-02-17 | Stop reason: HOSPADM

## 2021-02-17 RX ORDER — FERROUS SULFATE TAB EC 324 MG (65 MG FE EQUIVALENT) 324 (65 FE) MG
324 TABLET DELAYED RESPONSE ORAL
Qty: 30 TABLET | Refills: 0 | Status: SHIPPED | OUTPATIENT
Start: 2021-02-17 | End: 2021-03-19

## 2021-02-17 RX ORDER — POTASSIUM CHLORIDE 20 MEQ/1
40 TABLET, EXTENDED RELEASE ORAL ONCE
Status: COMPLETED | OUTPATIENT
Start: 2021-02-17 | End: 2021-02-17

## 2021-02-17 RX ADMIN — POTASSIUM CHLORIDE 40 MEQ: 1500 TABLET, EXTENDED RELEASE ORAL at 09:59

## 2021-02-17 RX ADMIN — DOCUSATE SODIUM 100 MG: 100 CAPSULE, LIQUID FILLED ORAL at 09:59

## 2021-02-17 RX ADMIN — IRON SUCROSE 300 MG: 20 INJECTION, SOLUTION INTRAVENOUS at 10:03

## 2021-02-17 RX ADMIN — BUPRENORPHINE AND NALOXONE 1 FILM: 8; 2 FILM BUCCAL; SUBLINGUAL at 09:58

## 2021-02-17 NOTE — ASSESSMENT & PLAN NOTE
· Utilizes suboxone therapy  · Confirmed w/ PDMP: Suboxone 8-2 SL-- last few Rx have been BID or TID-- ordered at BID this admission  · Continue on discharge

## 2021-02-17 NOTE — UTILIZATION REVIEW
Continued Stay Review    Date: 2/17/21                       Current Patient Class: Observation  Current Level of Care: Med Surg    HPI:45 y o  female initially admitted as Observation on 2/15/21 due to acute blood loss anemia in setting of iron deficiency anemia/ diabetes with recent start of metformin, after presenting to the ED due to worsening weakness and shortness of breath   Initial hemoglobin 5 7  Patient is unable to tolerate PO iron  Given two units PRBCs with improvement of H&H to 7 4/27  7  Started on IV Venofer               Pertinent Labs/Diagnostic Results:           Results from last 7 days   Lab Units 02/17/21  0510 02/16/21  0509 02/15/21  1453   WBC Thousand/uL 4 94 6 13 5 57   HEMOGLOBIN g/dL 7 3* 7 4* 5 7*   HEMATOCRIT % 28 0* 27 7* 22 9*   PLATELETS Thousands/uL 158 187 217   NEUTROS ABS Thousands/µL  --  2 52 3 16         Results from last 7 days   Lab Units 02/17/21  0510 02/16/21  0509 02/15/21  1453   SODIUM mmol/L 137 137 133*   POTASSIUM mmol/L 3 4* 3 3* 3 1*   CHLORIDE mmol/L 104 102 99*   CO2 mmol/L 24 25 25   ANION GAP mmol/L 9 10 9   BUN mg/dL 7 8 7   CREATININE mg/dL 0 55* 0 67 0 67   EGFR ml/min/1 73sq m 113 106 106   CALCIUM mg/dL 8 6 8 5 8 4     Results from last 7 days   Lab Units 02/16/21  0509   AST U/L 23   ALT U/L 26   ALK PHOS U/L 69   TOTAL PROTEIN g/dL 7 4   ALBUMIN g/dL 3 1*   TOTAL BILIRUBIN mg/dL 0 57         Results from last 7 days   Lab Units 02/17/21  0510 02/16/21  0509 02/15/21  1453   GLUCOSE RANDOM mg/dL 255* 220* 306*         Results from last 7 days   Lab Units 02/15/21  1453   HEMOGLOBIN A1C % 8 4*   EAG mg/dl 194       Results from last 7 days   Lab Units 02/15/21  1453   TROPONIN I ng/mL <0 02           Results from last 7 days   Lab Units 02/15/21  1453   FERRITIN ng/mL 2*                   Vital Signs:     Date/Time  Temp  Pulse  Resp  BP  MAP (mmHg)  SpO2  O2 Device  Patient Position - Orthostatic VS   02/17/21 0714  98 2 °F (36 8 °C)  70  18  126/60  -- --  None (Room air)  Lying   02/16/21 2227  98 3 °F (36 8 °C)  86  18  166/81  110  95 %  Nasal cannula  Lying           Medications:   Scheduled Medications:    buprenorphine-naloxone, 1 Film, Sublingual, BID  docusate sodium, 100 mg, Oral, BID  iron sucrose, 300 mg, Intravenous, Daily  potassium chloride, 40 mEq, Oral, Once      Continuous IV Infusions:  None  PRN Meds:    acetaminophen, 650 mg, Oral, Q6H PRN  calcium carbonate, 1,000 mg, Oral, Daily PRN  diphenhydrAMINE, 25 mg, Oral, HS PRN  ondansetron, 4 mg, Intravenous, Q6H PRN  simethicone, 80 mg, Oral, Q6H PRN        Discharge Plan:  D            Network Utilization Review Department  ATTENTION: Please call with any questions or concerns to [phone] and carefully listen to the prompts so that you are directed to the right person  All voicemails are confidential   Clement Faust all requests for admission clinical reviews, approved or denied determinations and any other requests to dedicated fax number below belonging to the campus where the patient is receiving treatment   List of dedicated fax numbers for the Facilities:  [address] DENIALS (Administrative/Medical Necessity) [phone]   [address] (Maternity/NICU/Pediatrics) [phone]   [address] Serafin Evans 0736 (Mariya Rayo "Marissa" 103) [address] [place] [address] Bianca Acuña 1481 [address] [place] [address] [phone]

## 2021-02-17 NOTE — PLAN OF CARE
Problem: PAIN - ADULT  Goal: Verbalizes/displays adequate comfort level or baseline comfort level  Description: Interventions:  - Encourage patient to monitor pain and request assistance  - Assess pain using appropriate pain scale  - Administer analgesics based on type and severity of pain and evaluate response  - Implement non-pharmacological measures as appropriate and evaluate response  - Consider cultural and social influences on pain and pain management  - Notify physician/advanced practitioner if interventions unsuccessful or patient reports new pain  Outcome: Adequate for Discharge     Problem: SAFETY ADULT  Goal: Patient will remain free of falls  Description: INTERVENTIONS:  - Assess patient frequently for physical needs  -  Identify cognitive and physical deficits and behaviors that affect risk of falls    -  [place] fall precautions as indicated by assessment   - Educate patient/family on patient safety including physical limitations  - Instruct patient to call for assistance with activity based on assessment  - Modify environment to reduce risk of injury  - Consider OT/PT consult to assist with strengthening/mobility  Outcome: Adequate for Discharge  Goal: Maintain or return to baseline ADL function  Description: INTERVENTIONS:  -  Assess patient's ability to carry out ADLs; assess patient's baseline for ADL function and identify physical deficits which impact ability to perform ADLs (bathing, care of mouth/teeth, toileting, grooming, dressing, etc )  - Assess/evaluate cause of self-care deficits   - Assess range of motion  - Assess patient's mobility; develop plan if impaired  - Assess patient's need for assistive devices and provide as appropriate  - Encourage maximum independence but intervene and supervise when necessary  - Involve family in performance of ADLs  - Assess for home care needs following discharge   - Consider OT consult to assist with ADL evaluation and planning for discharge  - Provide patient education as appropriate  Outcome: Adequate for Discharge  Goal: Maintain or return mobility status to optimal level  Description: INTERVENTIONS:  - Assess patient's baseline mobility status (ambulation, transfers, stairs, etc )    - Identify cognitive and physical deficits and behaviors that affect mobility  - Identify mobility aids required to assist with transfers and/or ambulation (gait belt, sit-to-stand, lift, walker, cane, etc )  - [place] fall precautions as indicated by assessment  - Record patient progress and toleration of activity level on Mobility SBAR; progress patient to next Phase/Stage  - Instruct patient to call for assistance with activity based on assessment  - Consider rehabilitation consult to assist with strengthening/weightbearing, etc   Outcome: Adequate for Discharge     Problem: DISCHARGE PLANNING  Goal: Discharge to home or other facility with appropriate resources  Description: INTERVENTIONS:  - Identify barriers to discharge w/patient and caregiver  - Arrange for needed discharge resources and transportation as appropriate  - Identify discharge learning needs (meds, wound care, etc )  - Arrange for interpretive services to assist at discharge as needed  - Refer to Case Management Department for coordinating discharge planning if the patient needs post-hospital services based on physician/advanced practitioner order or complex needs related to functional status, cognitive ability, or social support system  Outcome: Adequate for Discharge     Problem: Knowledge Deficit  Goal: Patient/family/caregiver demonstrates understanding of disease process, treatment plan, medications, and discharge instructions  Description: Complete learning assessment and assess knowledge base    Interventions:  - Provide teaching at level of understanding  - Provide teaching via preferred learning methods  Outcome: Adequate for Discharge     Problem: HEMATOLOGIC - ADULT  Goal: Maintains hematologic stability  Description: INTERVENTIONS  - Assess for signs and symptoms of bleeding or hemorrhage  - Monitor labs  - Administer supportive blood products/factors as ordered and appropriate  Outcome: Adequate for Discharge

## 2021-02-17 NOTE — ASSESSMENT & PLAN NOTE
Lab Results   Component Value Date    HGBA1C 8 4 (H) 02/15/2021       No results for input(s): POCGLU in the last 72 hours      Blood Sugar Average: Last 72 hrs:  · BG on arrival 306, hgb a1c 8 4%, SSI for correction here in hospital discontinued on discharge  · Started on metformin as an OP 1 week ago -- recommend resumption on d/c w/ lifestyle and dietary modifications

## 2021-02-17 NOTE — DISCHARGE INSTR - AVS FIRST PAGE
Dear Jamey Hinton,     It was our pleasure to care for you here at [facility], [address]  It is our hope that we were always able to exceed the expected standards for your care during your stay  You were hospitalized due to acute blood loss anemia with chronic iron deficiency anemia  You were cared for on the 4th floor by Emilia Stack DO under the service of Monica Rodgers MD with the [facility] who covers for your primary care physician (PCP), Koki Jensen MD, while you were hospitalized  If you have any questions or concerns related to this hospitalization, you may contact us at [phone]  For follow up as well as any medication refills, we recommend that you follow up with your primary care physician  A registered nurse will reach out to you by phone within a few days after your discharge to answer any additional questions that you may have after going home  However, at this time we provide for you here, the most important instructions / recommendations at discharge:     · Notable Medication Adjustments -   · Iron supplements daily, and then can discuss with primary care to decrease to at least every other day  · Colace twice a day to prevent constipation  · Testing Required after Discharge -   · None  · Important follow up information -   · We will provide to referrals for OBGYN and a hematologist  · Please reach out to [place] [address] [facility] in [place] at [phone] to establish a PCP/internist  · Please discussed scheduling a colonoscopy with your new primary care doctor  · Other Instructions -   · Continue your other home medications as prescribed  · Please review this entire after visit summary as additional general instructions including medication list, appointments, activity, diet, any pertinent wound care, and other additional recommendations from your care team that may be provided for you        Sincerely,     Emilia Stack DO

## 2021-02-17 NOTE — DISCHARGE SUMMARY
Discharge- Chanel Shells 1975, 54 y o  female MRN: [id number]    Unit/Bed#: S -01 Encounter: [id number]    Primary Care Provider: Rafi Diaz MD   Date and time admitted to hospital: 2/15/2021  2:04 PM        * Acute blood loss anemia w/ chronic iron deficiency anemia  Assessment & Plan  Patient presented w/ hgb of 5 7; about 3 years ago hgb was 8 5  · Patient notes chronic iron deficiency-- she has been having difficulty tolerating PO iron supplements  · Notes perimenopausal- + mehorrhagia, about 1 5 months ago, last menses was lighter than usual 2 weeks ago- not actively menstruating during hospitalization   · S/P 2U PRBC yesterday w/ increase in hgb to 7 3  · Reviewed iron panel-- low ferritin, high TIBC, low total iron   · venofer 300 mg IV given daily for 2 days  · Recommend continuing over-the-counter iron supplementation daily upon discharge  · Ambulatory referral to Hematology order placed inpatient informed to follow-up with hematologist to monitor hemoglobin and iron levels    Uncontrolled type 2 diabetes mellitus with hyperglycemia ([facility] [address] )  Assessment & Plan  Lab Results   Component Value Date    HGBA1C 8 4 (H) 02/15/2021       No results for input(s): POCGLU in the last 72 hours      Blood Sugar Average: Last 72 hrs:  · BG on arrival 306, hgb a1c 8 4%, SSI for correction here in hospital discontinued on discharge  · Started on metformin as an OP 1 week ago -- recommend resumption on d/c w/ lifestyle and dietary modifications      Essential hypertension  Assessment & Plan  BP low on admission, Lisinopril held     Plan:  Continue on discharge    Chronic low back pain  Assessment & Plan  · Utilizes suboxone therapy  · Confirmed w/ PDMP: Suboxone 8-2 SL-- last few Rx have been BID or TID-- ordered at BID this admission  · Continue on discharge        Discharging Resident Physician: Rigo Miramontes DO  Attending: No att  providers found  PCP: Rafi Diaz MD  Admission Date: 2/15/2021  Discharge Date: 02/17/21    Disposition:     Home    Reason for Admission:  Acute blood loss anemia with chronic iron deficiency anemia secondary to menorrhagia 1 5 months ago    Consultations During Hospital Stay:  · None    Procedures Performed:     · None    Significant Findings / Test Results:     · Hemoglobin on admission was 5 7, increased to 7 3 status post 2 units packed red blood cells    Incidental Findings:   · None     Test Results Pending at Discharge (will require follow up): · None     Outpatient Tests Requested:  · Contact PCP to discuss on order colonoscopy    Complications:  None    Hospital Course: Sanchez Zimmerman is a 54 y o  female past medical history significant for chronic iron deficiency anemia in common hypertension, chronic low back pain on Suboxone presents to the ED on 02/15/2021 for evaluation of worsening fatigue and dyspnea  Patient reported about 1 and half months ago she had a menstrual cycle that was particularly heavy, with patient saturating 1 pad per hour for approximately 6 days + relatively normal flow for an additional 3 days  After this episode, patient noted feeling fatigued  She was able to get some online lab work, including CBC and chemistries  Today, she got the results showing hemoglobin was 5 4  On 02/15/2021, patient had worsening dyspnea on exertion which also prompted her ER visit  Patient denies any chest pain, shortness of breath at rest   She admits to pallor  She denies any blood in her stool/melena and/or hematuria  She was also seen by primary care physician, who recommended starting iron tablets  Unfortunately, patient had significant nausea with taking iron tablets  Patient notes that her periods have been irregular, with varying degrees of menstrual flow, last menses 2 weeks was lighter than normal   Patient reports she has not seen OBGYN approximately 15 years since her daughter was born    Patient presented with tachycardia 115 and pale skin  Patient was given 2 units pRBCs, and started on Venofer 300 mg IV QD x 2days  Potassium was repleted as needed to maintain K>4  Patient was also given Colace 100mg BID for constipation  Patient's home suboxone was continued for pain management  On discharge date, the patient was advised to continue over-the-counter iron 325 mg supplementation daily with Colace 100 mg b i d  Daily for constipation prophylaxis  She was also advised to a use over-the-counter MiraLax if needed  Patient was told she will be given ambulatory referrals to and told to follow-up with OBGYN, hematology, and establish care with Internal Medicine primary care/resident Clinic and discuss having a colonoscopy in the future to ensure no GI bleeding  Condition at Discharge: good     Discharge Day Visit / Exam:     Subjective:  On day of discharge, patient had very limited complaints  Patient stated she continued to feel tired  Patient clearly stated she no longer feels weak  Patient also states she wanted to go home and feels no shortness of breath, chest pain, abdominal pain, headache, lightheadedness, and has no difficulty ambulating on her own  Vitals: Blood Pressure: 126/60 (02/17/21 0714)  Pulse: 70 (02/17/21 0714)  Temperature: 98 2 °F (36 8 °C) (02/17/21 0714)  Temp Source: Oral (02/17/21 0714)  Respirations: 18 (02/17/21 0714)  Height: 5' 5" (165 1 cm) (02/15/21 1930)  Weight - Scale: 85 3 kg (188 lb) (02/15/21 1930)  SpO2: 95 % (02/16/21 2227)     Exam:   Physical Exam  Vitals signs and nursing note reviewed  Constitutional:       General: She is not in acute distress  Appearance: Normal appearance  She is well-developed  She is obese  She is not ill-appearing, toxic-appearing or diaphoretic  HENT:      Head: Normocephalic and atraumatic        Right Ear: External ear normal       Left Ear: External ear normal       Nose: Nose normal       Mouth/Throat:      Mouth: Mucous membranes are moist  Pharynx: Oropharynx is clear  Eyes:      General: No scleral icterus  Conjunctiva/sclera: Conjunctivae normal       Pupils: Pupils are equal, round, and reactive to light  Neck:      Musculoskeletal: Normal range of motion and neck supple  Thyroid: No thyromegaly  Vascular: No JVD  Cardiovascular:      Rate and Rhythm: Normal rate and regular rhythm  Pulses: Normal pulses  Heart sounds: Normal heart sounds  No murmur  No friction rub  No gallop  Pulmonary:      Effort: Pulmonary effort is normal  No respiratory distress  Breath sounds: Normal breath sounds  No stridor  No wheezing, rhonchi or rales  Abdominal:      General: Bowel sounds are normal  There is no distension  Palpations: Abdomen is soft  There is no mass  Tenderness: There is no abdominal tenderness  There is no right CVA tenderness, left CVA tenderness or guarding  Musculoskeletal: Normal range of motion  General: No tenderness  Right lower leg: No edema  Left lower leg: No edema  Skin:     General: Skin is warm and dry  Capillary Refill: Capillary refill takes less than 2 seconds  Coloration: Skin is not pale  Findings: No erythema or rash  Neurological:      General: No focal deficit present  Mental Status: She is alert and oriented to person, place, and time  Coordination: Coordination normal       Gait: Gait normal    Psychiatric:         Mood and Affect: Mood normal          Behavior: Behavior normal          Judgment: Judgment normal          Discussion with Family:  Patient's hospitalization discussed with , and  was informed regarding today's discharged and to make his way over to the hospital for pickup   was also informed regarding up patient plans to continue iron and anti constipation prophylaxis as well as referrals to OBGYN, Hematology, and establishing care with a PCP     was also informed to have the patient follow up on a colonoscopy in the future  Discharge instructions/Information to patient and family:   See after visit summary for information provided to patient and family  Provisions for Follow-Up Care:  See after visit summary for information related to follow-up care and any pertinent home health orders  Planned Readmission:  No     Discharge Medications:  See after visit summary for reconciled discharge medications provided to patient and family        ** Please Note: This note has been constructed using a voice recognition system **

## 2021-02-17 NOTE — ASSESSMENT & PLAN NOTE
Patient presented w/ hgb of 5 7; about 3 years ago hgb was 8 5  · Patient notes chronic iron deficiency-- she has been having difficulty tolerating PO iron supplements  · Notes perimenopausal- + mehorrhagia, about 1 5 months ago, last menses was lighter than usual 2 weeks ago- not actively menstruating during hospitalization   · S/P 2U PRBC yesterday w/ increase in hgb to 7 3  · Reviewed iron panel-- low ferritin, high TIBC, low total iron   · venofer 300 mg IV given daily for 2 days  · Recommend continuing over-the-counter iron supplementation daily upon discharge  · Ambulatory referral to Hematology order placed inpatient informed to follow-up with hematologist to monitor hemoglobin and iron levels

## 2021-02-24 ENCOUNTER — TELEPHONE (OUTPATIENT)
Dept: SURGICAL ONCOLOGY | Facility: CLINIC | Age: 46
End: 2021-02-24

## 2021-02-24 NOTE — TELEPHONE ENCOUNTER
Have called pt three times and left messages  Pt has not returned any calls  She has our number should she want to call to schedle appt

## 2023-07-14 ENCOUNTER — APPOINTMENT (OUTPATIENT)
Dept: LAB | Facility: MEDICAL CENTER | Age: 48
End: 2023-07-14
Payer: COMMERCIAL

## 2023-07-14 DIAGNOSIS — R53.83 OTHER FATIGUE: ICD-10-CM

## 2023-07-14 DIAGNOSIS — E78.41 ELEVATED LIPOPROTEIN(A): ICD-10-CM

## 2023-07-14 DIAGNOSIS — I10 ESSENTIAL HYPERTENSION, BENIGN: ICD-10-CM

## 2023-07-14 DIAGNOSIS — D64.9 ANEMIA, UNSPECIFIED TYPE: ICD-10-CM

## 2023-07-14 DIAGNOSIS — E11.9 DIABETES MELLITUS WITHOUT COMPLICATION (HCC): ICD-10-CM

## 2023-07-14 LAB
ALBUMIN SERPL BCP-MCNC: 3.5 G/DL (ref 3.5–5)
ALP SERPL-CCNC: 50 U/L (ref 46–116)
ALT SERPL W P-5'-P-CCNC: 34 U/L (ref 12–78)
ANION GAP SERPL CALCULATED.3IONS-SCNC: 7 MMOL/L
AST SERPL W P-5'-P-CCNC: 24 U/L (ref 5–45)
BASOPHILS # BLD AUTO: 0.04 THOUSANDS/ÂΜL (ref 0–0.1)
BASOPHILS NFR BLD AUTO: 1 % (ref 0–1)
BILIRUB SERPL-MCNC: 0.3 MG/DL (ref 0.2–1)
BUN SERPL-MCNC: 9 MG/DL (ref 5–25)
CALCIUM SERPL-MCNC: 9.3 MG/DL (ref 8.3–10.1)
CHLORIDE SERPL-SCNC: 109 MMOL/L (ref 96–108)
CHOLEST SERPL-MCNC: 179 MG/DL
CO2 SERPL-SCNC: 24 MMOL/L (ref 21–32)
CREAT SERPL-MCNC: 0.57 MG/DL (ref 0.6–1.3)
EOSINOPHIL # BLD AUTO: 0.14 THOUSAND/ÂΜL (ref 0–0.61)
EOSINOPHIL NFR BLD AUTO: 2 % (ref 0–6)
ERYTHROCYTE [DISTWIDTH] IN BLOOD BY AUTOMATED COUNT: 17.1 % (ref 11.6–15.1)
EST. AVERAGE GLUCOSE BLD GHB EST-MCNC: 143 MG/DL
FERRITIN SERPL-MCNC: 4 NG/ML (ref 11–307)
FOLATE SERPL-MCNC: 7.1 NG/ML
GFR SERPL CREATININE-BSD FRML MDRD: 109 ML/MIN/1.73SQ M
GLUCOSE P FAST SERPL-MCNC: 83 MG/DL (ref 65–99)
HBA1C MFR BLD: 6.6 %
HCT VFR BLD AUTO: 35.2 % (ref 34.8–46.1)
HDLC SERPL-MCNC: 42 MG/DL
HGB BLD-MCNC: 10.3 G/DL (ref 11.5–15.4)
IMM GRANULOCYTES # BLD AUTO: 0.01 THOUSAND/UL (ref 0–0.2)
IMM GRANULOCYTES NFR BLD AUTO: 0 % (ref 0–2)
IRON SERPL-MCNC: 26 UG/DL (ref 50–170)
LDLC SERPL CALC-MCNC: 95 MG/DL (ref 0–100)
LYMPHOCYTES # BLD AUTO: 3.27 THOUSANDS/ÂΜL (ref 0.6–4.47)
LYMPHOCYTES NFR BLD AUTO: 51 % (ref 14–44)
MCH RBC QN AUTO: 22.1 PG (ref 26.8–34.3)
MCHC RBC AUTO-ENTMCNC: 29.3 G/DL (ref 31.4–37.4)
MCV RBC AUTO: 76 FL (ref 82–98)
MONOCYTES # BLD AUTO: 0.76 THOUSAND/ÂΜL (ref 0.17–1.22)
MONOCYTES NFR BLD AUTO: 12 % (ref 4–12)
NEUTROPHILS # BLD AUTO: 2.22 THOUSANDS/ÂΜL (ref 1.85–7.62)
NEUTS SEG NFR BLD AUTO: 34 % (ref 43–75)
NONHDLC SERPL-MCNC: 137 MG/DL
NRBC BLD AUTO-RTO: 0 /100 WBCS
PLATELET # BLD AUTO: 257 THOUSANDS/UL (ref 149–390)
PMV BLD AUTO: 9.3 FL (ref 8.9–12.7)
POTASSIUM SERPL-SCNC: 4.2 MMOL/L (ref 3.5–5.3)
PROT SERPL-MCNC: 7.8 G/DL (ref 6.4–8.4)
RBC # BLD AUTO: 4.66 MILLION/UL (ref 3.81–5.12)
SODIUM SERPL-SCNC: 140 MMOL/L (ref 135–147)
T3 SERPL-MCNC: 1.4 NG/ML
T4 FREE SERPL-MCNC: 0.52 NG/DL (ref 0.61–1.12)
TIBC SERPL-MCNC: 463 UG/DL (ref 250–450)
TRIGL SERPL-MCNC: 209 MG/DL
TSH SERPL DL<=0.05 MIU/L-ACNC: 2.05 UIU/ML (ref 0.45–4.5)
VIT B12 SERPL-MCNC: 239 PG/ML (ref 180–914)
WBC # BLD AUTO: 6.44 THOUSAND/UL (ref 4.31–10.16)

## 2023-07-14 PROCEDURE — 83540 ASSAY OF IRON: CPT

## 2023-07-14 PROCEDURE — 83550 IRON BINDING TEST: CPT

## 2023-07-14 PROCEDURE — 82728 ASSAY OF FERRITIN: CPT

## 2023-07-14 PROCEDURE — 84439 ASSAY OF FREE THYROXINE: CPT

## 2023-07-14 PROCEDURE — 83036 HEMOGLOBIN GLYCOSYLATED A1C: CPT

## 2023-07-14 PROCEDURE — 84443 ASSAY THYROID STIM HORMONE: CPT

## 2023-07-14 PROCEDURE — 80053 COMPREHEN METABOLIC PANEL: CPT

## 2023-07-14 PROCEDURE — 84480 ASSAY TRIIODOTHYRONINE (T3): CPT

## 2023-07-14 PROCEDURE — 82607 VITAMIN B-12: CPT

## 2023-07-14 PROCEDURE — 85025 COMPLETE CBC W/AUTO DIFF WBC: CPT

## 2023-07-14 PROCEDURE — 80061 LIPID PANEL: CPT

## 2023-07-14 PROCEDURE — 82746 ASSAY OF FOLIC ACID SERUM: CPT

## 2023-07-14 PROCEDURE — 36415 COLL VENOUS BLD VENIPUNCTURE: CPT

## 2023-07-27 ENCOUNTER — HOSPITAL ENCOUNTER (EMERGENCY)
Facility: HOSPITAL | Age: 48
Discharge: HOME | End: 2023-07-27
Attending: EMERGENCY MEDICINE
Payer: COMMERCIAL

## 2023-07-27 ENCOUNTER — APPOINTMENT (EMERGENCY)
Dept: CT IMAGING | Facility: HOSPITAL | Age: 48
End: 2023-07-27
Payer: COMMERCIAL

## 2023-07-27 VITALS
DIASTOLIC BLOOD PRESSURE: 75 MMHG | RESPIRATION RATE: 17 BRPM | HEART RATE: 77 BPM | TEMPERATURE: 98.1 F | OXYGEN SATURATION: 97 % | SYSTOLIC BLOOD PRESSURE: 135 MMHG

## 2023-07-27 DIAGNOSIS — K29.70 GASTRITIS: Primary | ICD-10-CM

## 2023-07-27 DIAGNOSIS — R10.84 GENERALIZED ABDOMINAL PAIN: ICD-10-CM

## 2023-07-27 LAB
ALBUMIN SERPL BCP-MCNC: 4.1 G/DL (ref 3.5–5)
ALP SERPL-CCNC: 42 U/L (ref 34–104)
ALT SERPL W P-5'-P-CCNC: 21 U/L (ref 7–52)
ANION GAP SERPL CALCULATED.3IONS-SCNC: 5 MMOL/L
AST SERPL W P-5'-P-CCNC: 20 U/L (ref 13–39)
BACTERIA UR QL AUTO: ABNORMAL /HPF
BASOPHILS # BLD AUTO: 0.04 THOUSANDS/ÂΜL (ref 0–0.1)
BASOPHILS NFR BLD AUTO: 1 % (ref 0–1)
BILIRUB SERPL-MCNC: 0.22 MG/DL (ref 0.2–1)
BILIRUB UR QL STRIP: NEGATIVE
BUN SERPL-MCNC: 10 MG/DL (ref 5–25)
CALCIUM SERPL-MCNC: 9 MG/DL (ref 8.4–10.2)
CHLORIDE SERPL-SCNC: 105 MMOL/L (ref 96–108)
CLARITY UR: CLEAR
CO2 SERPL-SCNC: 26 MMOL/L (ref 21–32)
COLOR UR: ABNORMAL
CREAT SERPL-MCNC: 0.49 MG/DL (ref 0.6–1.3)
EOSINOPHIL # BLD AUTO: 0.16 THOUSAND/ÂΜL (ref 0–0.61)
EOSINOPHIL NFR BLD AUTO: 3 % (ref 0–6)
ERYTHROCYTE [DISTWIDTH] IN BLOOD BY AUTOMATED COUNT: 17.4 % (ref 11.6–15.1)
GFR SERPL CREATININE-BSD FRML MDRD: 114 ML/MIN/1.73SQ M
GLUCOSE SERPL-MCNC: 167 MG/DL (ref 65–140)
GLUCOSE UR STRIP-MCNC: ABNORMAL MG/DL
HCT VFR BLD AUTO: 34.4 % (ref 34.8–46.1)
HGB BLD-MCNC: 10.1 G/DL (ref 11.5–15.4)
HGB UR QL STRIP.AUTO: NEGATIVE
IMM GRANULOCYTES # BLD AUTO: 0.01 THOUSAND/UL (ref 0–0.2)
IMM GRANULOCYTES NFR BLD AUTO: 0 % (ref 0–2)
KETONES UR STRIP-MCNC: NEGATIVE MG/DL
LEUKOCYTE ESTERASE UR QL STRIP: ABNORMAL
LIPASE SERPL-CCNC: 19 U/L (ref 11–82)
LYMPHOCYTES # BLD AUTO: 2.33 THOUSANDS/ÂΜL (ref 0.6–4.47)
LYMPHOCYTES NFR BLD AUTO: 43 % (ref 14–44)
MCH RBC QN AUTO: 22.2 PG (ref 26.8–34.3)
MCHC RBC AUTO-ENTMCNC: 29.4 G/DL (ref 31.4–37.4)
MCV RBC AUTO: 76 FL (ref 82–98)
MONOCYTES # BLD AUTO: 0.5 THOUSAND/ÂΜL (ref 0.17–1.22)
MONOCYTES NFR BLD AUTO: 9 % (ref 4–12)
NEUTROPHILS # BLD AUTO: 2.36 THOUSANDS/ÂΜL (ref 1.85–7.62)
NEUTS SEG NFR BLD AUTO: 44 % (ref 43–75)
NITRITE UR QL STRIP: NEGATIVE
NON-SQ EPI CELLS URNS QL MICRO: ABNORMAL /HPF
NRBC BLD AUTO-RTO: 0 /100 WBCS
PH UR STRIP.AUTO: 5.5 [PH]
PLATELET # BLD AUTO: 241 THOUSANDS/UL (ref 149–390)
PMV BLD AUTO: 8.7 FL (ref 8.9–12.7)
POTASSIUM SERPL-SCNC: 3.9 MMOL/L (ref 3.5–5.3)
PROT SERPL-MCNC: 7.5 G/DL (ref 6.4–8.4)
PROT UR STRIP-MCNC: ABNORMAL MG/DL
RBC # BLD AUTO: 4.54 MILLION/UL (ref 3.81–5.12)
RBC #/AREA URNS AUTO: ABNORMAL /HPF
SODIUM SERPL-SCNC: 136 MMOL/L (ref 135–147)
SP GR UR STRIP.AUTO: 1.03 (ref 1–1.03)
UROBILINOGEN UR STRIP-ACNC: <2 MG/DL
WBC # BLD AUTO: 5.4 THOUSAND/UL (ref 4.31–10.16)
WBC #/AREA URNS AUTO: ABNORMAL /HPF

## 2023-07-27 PROCEDURE — 93005 ELECTROCARDIOGRAM TRACING: CPT

## 2023-07-27 PROCEDURE — 81001 URINALYSIS AUTO W/SCOPE: CPT | Performed by: EMERGENCY MEDICINE

## 2023-07-27 PROCEDURE — 85025 COMPLETE CBC W/AUTO DIFF WBC: CPT | Performed by: EMERGENCY MEDICINE

## 2023-07-27 PROCEDURE — 96374 THER/PROPH/DIAG INJ IV PUSH: CPT

## 2023-07-27 PROCEDURE — 99285 EMERGENCY DEPT VISIT HI MDM: CPT

## 2023-07-27 PROCEDURE — 83690 ASSAY OF LIPASE: CPT | Performed by: EMERGENCY MEDICINE

## 2023-07-27 PROCEDURE — 96375 TX/PRO/DX INJ NEW DRUG ADDON: CPT

## 2023-07-27 PROCEDURE — G1004 CDSM NDSC: HCPCS

## 2023-07-27 PROCEDURE — 99284 EMERGENCY DEPT VISIT MOD MDM: CPT

## 2023-07-27 PROCEDURE — 96361 HYDRATE IV INFUSION ADD-ON: CPT

## 2023-07-27 PROCEDURE — 80053 COMPREHEN METABOLIC PANEL: CPT | Performed by: EMERGENCY MEDICINE

## 2023-07-27 PROCEDURE — 74177 CT ABD & PELVIS W/CONTRAST: CPT

## 2023-07-27 PROCEDURE — 36415 COLL VENOUS BLD VENIPUNCTURE: CPT

## 2023-07-27 RX ORDER — FAMOTIDINE 20 MG/1
20 TABLET, FILM COATED ORAL 2 TIMES DAILY
Qty: 30 TABLET | Refills: 0 | Status: SHIPPED | OUTPATIENT
Start: 2023-07-27 | End: 2023-08-11

## 2023-07-27 RX ORDER — METOCLOPRAMIDE HYDROCHLORIDE 5 MG/ML
10 INJECTION INTRAMUSCULAR; INTRAVENOUS ONCE
Status: COMPLETED | OUTPATIENT
Start: 2023-07-27 | End: 2023-07-27

## 2023-07-27 RX ORDER — FENTANYL CITRATE 50 UG/ML
50 INJECTION, SOLUTION INTRAMUSCULAR; INTRAVENOUS ONCE
Status: COMPLETED | OUTPATIENT
Start: 2023-07-27 | End: 2023-07-27

## 2023-07-27 RX ORDER — AMLODIPINE BESYLATE 5 MG/1
5 TABLET ORAL 2 TIMES DAILY
COMMUNITY
Start: 2023-04-24

## 2023-07-27 RX ORDER — PANTOPRAZOLE SODIUM 40 MG/1
40 TABLET, DELAYED RELEASE ORAL DAILY
Qty: 10 TABLET | Refills: 0 | Status: SHIPPED | OUTPATIENT
Start: 2023-07-27 | End: 2023-08-11

## 2023-07-27 RX ADMIN — FENTANYL CITRATE 50 MCG: 50 INJECTION INTRAMUSCULAR; INTRAVENOUS at 11:13

## 2023-07-27 RX ADMIN — SODIUM CHLORIDE 1000 ML: 0.9 INJECTION, SOLUTION INTRAVENOUS at 11:15

## 2023-07-27 RX ADMIN — METOCLOPRAMIDE 10 MG: 5 INJECTION, SOLUTION INTRAMUSCULAR; INTRAVENOUS at 11:13

## 2023-07-27 RX ADMIN — IOHEXOL 100 ML: 350 INJECTION, SOLUTION INTRAVENOUS at 11:53

## 2023-07-27 NOTE — ED PROVIDER NOTES
History  Chief Complaint   Patient presents with   • Abdominal Pain     Pt c/o mid abdominal pain sudden onset 3 hrs ago. Describes pain as "sharp."     51year old female presenting today with mid abdominal pain starting this AM. Nausea, no vomiting. Takes aspirin pain. Sharp, does not radiate. No numbness or tingling. Hx of  section. Prior to Admission Medications   Prescriptions Last Dose Informant Patient Reported? Taking? amLODIPine (NORVASC) 5 mg tablet 2023  Yes Yes   Sig: Take 5 mg by mouth 2 (two) times a day As directed   buprenorphine-naloxone (SUBOXONE) 8-2 mg per SL tablet 2023  Yes Yes   Sig: Place 1 tablet under the tongue 2 (two) times a day   lisinopril (ZESTRIL) 10 mg tablet Not Taking  Yes No   Sig: Take 20 mg by mouth daily     Patient not taking: Reported on 2023   lisinopril (ZESTRIL) 20 mg tablet 2023  Yes Yes   Sig: Take 20 mg by mouth 2 (two) times a day   sitaGLIPtin-metFORMIN (JANUMET)  MG per tablet 2023  Yes Yes   Sig: Take 1 tablet by mouth daily   traMADol (ULTRAM) 50 mg tablet Not Taking  Yes No   Sig: Take 50 mg by mouth every 6 (six) hours as needed for moderate pain. Patient not taking: Reported on 2023      Facility-Administered Medications: None       Past Medical History:   Diagnosis Date   • Connective tissue disorder ([address])     non-specific   • Degenerative disc disease, lumbar    • Diabetes mellitus ([address])    • Gastritis    • Hypertension        Past Surgical History:   Procedure Laterality Date   •  SECTION     •  SECTION         History reviewed. No pertinent family history. I have reviewed and agree with the history as documented.     E-Cigarette/Vaping   • E-Cigarette Use Never User      E-Cigarette/Vaping Substances     Social History     Tobacco Use   • Smoking status: Former     Types: Cigarettes   • Smokeless tobacco: Never   Vaping Use   • Vaping Use: Never used   Substance Use Topics   • Alcohol use: Not Currently   • Drug use: No       Review of Systems    Physical Exam  Physical Exam    Vital Signs  ED Triage Vitals [07/27/23 0945]   Temperature Pulse Respirations Blood Pressure SpO2   98.1 °F (36.7 °C) 87 18 (!) 172/86 99 %      Temp Source Heart Rate Source Patient Position - Orthostatic VS BP Location FiO2 (%)   Oral Monitor Sitting Right arm --      Pain Score       7           Vitals:    07/27/23 0945 07/27/23 1115   BP: (!) 172/86 135/75   Pulse: 87 77   Patient Position - Orthostatic VS: Sitting Sitting         Visual Acuity      ED Medications  Medications   fentanyl citrate (PF) 100 MCG/2ML 50 mcg (50 mcg Intravenous Given 7/27/23 1113)   metoclopramide (REGLAN) injection 10 mg (10 mg Intravenous Given 7/27/23 1113)   sodium chloride 0.9 % bolus 1,000 mL (0 mL Intravenous Stopped 7/27/23 1400)   iohexol (OMNIPAQUE) 350 MG/ML injection (SINGLE-DOSE) 100 mL (100 mL Intravenous Given 7/27/23 1153)       Diagnostic Studies  Results Reviewed     Procedure Component Value Units Date/Time    Urine Microscopic [[id number]]  (Abnormal) Collected: 07/27/23 1021    Lab Status: Final result Specimen: Urine, Clean Catch Updated: 07/27/23 1054     RBC, UA 1-2 /hpf      WBC, UA 2-4 /hpf      Epithelial Cells Occasional /hpf      Bacteria, UA Occasional /hpf     UA w Reflex to Microscopic w Reflex to Culture [[id number]]  (Abnormal) Collected: 07/27/23 1021    Lab Status: Final result Specimen: Urine, Clean Catch Updated: 07/27/23 1040     Color, UA Light Yellow     Clarity, UA Clear     Specific Gravity, UA 1.028     pH, UA 5.5     Leukocytes, UA Small     Nitrite, UA Negative     Protein, UA 30 (1+) mg/dl      Glucose, UA Trace mg/dl      Ketones, UA Negative mg/dl      Urobilinogen, UA <2.0 mg/dl      Bilirubin, UA Negative     Occult Blood, UA Negative    Comprehensive metabolic panel [[id number]]  (Abnormal) Collected: 07/27/23 0948    Lab Status: Final result Specimen: Blood from Arm, Left Updated: 07/27/23 1012     Sodium 136 mmol/L      Potassium 3.9 mmol/L      Chloride 105 mmol/L      CO2 26 mmol/L      ANION GAP 5 mmol/L      BUN 10 mg/dL      Creatinine 0.49 mg/dL      Glucose 167 mg/dL      Calcium 9.0 mg/dL      AST 20 U/L      ALT 21 U/L      Alkaline Phosphatase 42 U/L      Total Protein 7.5 g/dL      Albumin 4.1 g/dL      Total Bilirubin 0.22 mg/dL      eGFR 114 ml/min/1.73sq m     Narrative:      National Kidney Disease Foundation guidelines for Chronic Kidney Disease (CKD):   •  Stage 1 with normal or high GFR (GFR > 90 mL/min/1.73 square meters)  •  Stage 2 Mild CKD (GFR = 60-89 mL/min/1.73 square meters)  •  Stage 3A Moderate CKD (GFR = 45-59 mL/min/1.73 square meters)  •  Stage 3B Moderate CKD (GFR = 30-44 mL/min/1.73 square meters)  •  Stage 4 Severe CKD (GFR = 15-29 mL/min/1.73 square meters)  •  Stage 5 End Stage CKD (GFR <15 mL/min/1.73 square meters)  Note: GFR calculation is accurate only with a steady state creatinine    Lipase [[id number]]  (Normal) Collected: 07/27/23 0948    Lab Status: Final result Specimen: Blood from Arm, Left Updated: 07/27/23 1012     Lipase 19 u/L     CBC and differential [[id number]]  (Abnormal) Collected: 07/27/23 0948    Lab Status: Final result Specimen: Blood from Arm, Left Updated: 07/27/23 0954     WBC 5.40 Thousand/uL      RBC 4.54 Million/uL      Hemoglobin 10.1 g/dL      Hematocrit 34.4 %      MCV 76 fL      MCH 22.2 pg      MCHC 29.4 g/dL      RDW 17.4 %      MPV 8.7 fL      Platelets 098 Thousands/uL      nRBC 0 /100 WBCs      Neutrophils Relative 44 %      Immat GRANS % 0 %      Lymphocytes Relative 43 %      Monocytes Relative 9 %      Eosinophils Relative 3 %      Basophils Relative 1 %      Neutrophils Absolute 2.36 Thousands/µL      Immature Grans Absolute 0.01 Thousand/uL      Lymphocytes Absolute 2.33 Thousands/µL      Monocytes Absolute 0.50 Thousand/µL      Eosinophils Absolute 0.16 Thousand/µL      Basophils Absolute 0.04 Thousands/µL CT abdomen pelvis with contrast   Final Result by Paulie Rivera MD (07/27 8170)      1. No acute findings in the abdomen/pelvis. 2.  Appendix is noted to be slightly prominent at 7 to 8 mm in diameter but this is noted to be stable from the prior 2018 exam, probably normal variant. No periappendiceal infiltrative changes. Workstation performed: [id number]                    Procedures  Procedures         ED Course  ED Course as of 07/29/23 1609   Thu Jul 27, 2023   1047 WBC: 5.40   1047 eGFR: 114   1047 Sodium: 136   1047 Potassium: 3.9   1047 Chloride: 105   1047 Anion Gap: 5                               SBIRT 20yo+    Flowsheet Row Most Recent Value   Initial Alcohol Screen: US AUDIT-C     1. How often do you have a drink containing alcohol? 0 Filed at: 07/27/2023 1122   2. How many drinks containing alcohol do you have on a typical day you are drinking? 0 Filed at: 07/27/2023 1122   3a. Male UNDER 65: How often do you have five or more drinks on one occasion? 0 Filed at: 07/27/2023 1122   3b. FEMALE Any Age, or MALE 65+: How often do you have 4 or more drinks on one occassion? 0 Filed at: 07/27/2023 1122   Audit-C Score 0 Filed at: 07/27/2023 1122   JALEEL: How many times in the past year have you. .. Used an illegal drug or used a prescription medication for non-medical reasons? Never Filed at: 07/27/2023 1122                    MDM    Disposition  Final diagnoses:   Gastritis   Generalized abdominal pain     Time reflects when diagnosis was documented in both MDM as applicable and the Disposition within this note     Time User Action Codes Description Comment    7/27/2023  1:54 PM Antoni Reina Add [K29.70] Gastritis     7/27/2023  1:54 PM Hira Patelck, [address] [R10.84] Generalized abdominal pain       ED Disposition     ED Disposition   Discharge    Condition   Stable    Date/Time   Thu Jul 27, 2023  1:54 PM    Comment   Gaby Clements discharge to home/self care. Follow-up Information     Follow up With Specialties Details Why Contact Info Additional [address] [facility] Emergency Medicine Go to  If symptoms worsen [address] [facility], [place], [place], [address] Dmitri [place], MD Internal Medicine Schedule an appointment as soon as possible for a visit  As needed [place] [id number]  [phone]             Discharge Medication List as of 7/27/2023  1:55 PM      START taking these medications    Details   famotidine (PEPCID) 20 mg tablet Take 1 tablet (20 mg total) by mouth 2 (two) times a day, Starting Thu 7/27/2023, Normal      pantoprazole (PROTONIX) 40 mg tablet Take 1 tablet (40 mg total) by mouth daily for 10 days, Starting u 7/27/2023, Until Sun 8/6/2023, Normal         CONTINUE these medications which have NOT CHANGED    Details   amLODIPine (NORVASC) 5 mg tablet Take 5 mg by mouth 2 (two) times a day As directed, Starting Mon 4/24/2023, Historical Med      buprenorphine-naloxone (SUBOXONE) 8-2 mg per SL tablet Place 1 tablet under the tongue 2 (two) times a day, Historical Med      !! lisinopril (ZESTRIL) 10 mg tablet Take 20 mg by mouth daily  , Until Discontinued, Historical Med      !! lisinopril (ZESTRIL) 20 mg tablet Take 20 mg by mouth 2 (two) times a day, Historical Med      sitaGLIPtin-metFORMIN (JANUMET)  MG per tablet Take 1 tablet by mouth daily, Until Discontinued, Historical Med      traMADol (ULTRAM) 50 mg tablet Take 50 mg by mouth every 6 (six) hours as needed for moderate pain., Until Discontinued, Historical Med       !! - Potential duplicate medications found. Please discuss with provider. No discharge procedures on file.     PDMP Review       Value Time User    PDMP Reviewed  Yes 2/17/2021 12:15 PM Solange Lopes DO          ED Provider  Electronically Signed by Follow-up Information     Follow up With Specialties Details Why Contact Info Additional [address] [facility] Emergency Medicine Go to  If symptoms worsen [address] [facility], [address] [place], [place], [facility] Dmitri [place], MD Internal Medicine Schedule an appointment as soon as possible for a visit  As needed [facility] [id number]  [phone]             Discharge Medication List as of 7/27/2023  1:55 PM      START taking these medications    Details   famotidine (PEPCID) 20 mg tablet Take 1 tablet (20 mg total) by mouth 2 (two) times a day, Starting Thu 7/27/2023, Normal      pantoprazole (PROTONIX) 40 mg tablet Take 1 tablet (40 mg total) by mouth daily for 10 days, Starting u 7/27/2023, Until Sun 8/6/2023, Normal         CONTINUE these medications which have NOT CHANGED    Details   amLODIPine (NORVASC) 5 mg tablet Take 5 mg by mouth 2 (two) times a day As directed, Starting Mon 4/24/2023, Historical Med      buprenorphine-naloxone (SUBOXONE) 8-2 mg per SL tablet Place 1 tablet under the tongue 2 (two) times a day, Historical Med      !! lisinopril (ZESTRIL) 20 mg tablet Take 20 mg by mouth 2 (two) times a day, Historical Med      sitaGLIPtin-metFORMIN (JANUMET)  MG per tablet Take 1 tablet by mouth daily, Until Discontinued, Historical Med      !! lisinopril (ZESTRIL) 10 mg tablet Take 20 mg by mouth daily  , Until Discontinued, Historical Med      traMADol (ULTRAM) 50 mg tablet Take 50 mg by mouth every 6 (six) hours as needed for moderate pain., Until Discontinued, Historical Med       !! - Potential duplicate medications found. Please discuss with provider. No discharge procedures on file.     PDMP Review       Value Time User    PDMP Reviewed  Yes 2/17/2021 12:15 PM Bertrand Alonso DO          ED Provider  Electronically Signed by           Monse Lakhani PA-C  07/31/23 6303

## 2023-07-28 LAB
ATRIAL RATE: 77 BPM
P AXIS: 40 DEGREES
PR INTERVAL: 136 MS
QRS AXIS: 35 DEGREES
QRSD INTERVAL: 82 MS
QT INTERVAL: 394 MS
QTC INTERVAL: 445 MS
T WAVE AXIS: 20 DEGREES
VENTRICULAR RATE: 77 BPM

## 2023-07-28 PROCEDURE — 93010 ELECTROCARDIOGRAM REPORT: CPT | Performed by: INTERNAL MEDICINE

## 2023-08-10 RX ORDER — ONDANSETRON 4 MG/1
TABLET, FILM COATED ORAL
COMMUNITY
Start: 2023-07-17

## 2023-08-10 RX ORDER — BUPRENORPHINE HYDROCHLORIDE 8 MG/1
TABLET SUBLINGUAL
COMMUNITY
Start: 2023-06-26

## 2023-08-10 RX ORDER — BLOOD SUGAR DIAGNOSTIC
STRIP MISCELLANEOUS
COMMUNITY
Start: 2023-05-03

## 2023-08-11 ENCOUNTER — OFFICE VISIT (OUTPATIENT)
Dept: FAMILY MEDICINE CLINIC | Facility: CLINIC | Age: 48
End: 2023-08-11
Payer: COMMERCIAL

## 2023-08-11 VITALS
SYSTOLIC BLOOD PRESSURE: 138 MMHG | DIASTOLIC BLOOD PRESSURE: 94 MMHG | WEIGHT: 206 LBS | HEART RATE: 78 BPM | RESPIRATION RATE: 18 BRPM | HEIGHT: 64 IN | BODY MASS INDEX: 35.17 KG/M2 | TEMPERATURE: 97.8 F | OXYGEN SATURATION: 97 %

## 2023-08-11 DIAGNOSIS — E13.69 OTHER SPECIFIED DIABETES MELLITUS WITH OTHER SPECIFIED COMPLICATION, UNSPECIFIED WHETHER LONG TERM INSULIN USE (HCC): ICD-10-CM

## 2023-08-11 DIAGNOSIS — Z12.4 SCREENING FOR CERVICAL CANCER: ICD-10-CM

## 2023-08-11 DIAGNOSIS — E11.65 UNCONTROLLED TYPE 2 DIABETES MELLITUS WITH HYPERGLYCEMIA (HCC): ICD-10-CM

## 2023-08-11 DIAGNOSIS — Z12.11 SCREENING FOR COLORECTAL CANCER: ICD-10-CM

## 2023-08-11 DIAGNOSIS — E11.65 TYPE 2 DIABETES MELLITUS WITH HYPERGLYCEMIA, WITHOUT LONG-TERM CURRENT USE OF INSULIN (HCC): ICD-10-CM

## 2023-08-11 DIAGNOSIS — I10 ESSENTIAL HYPERTENSION: ICD-10-CM

## 2023-08-11 DIAGNOSIS — Z12.12 SCREENING FOR COLORECTAL CANCER: ICD-10-CM

## 2023-08-11 DIAGNOSIS — Z12.31 ENCOUNTER FOR SCREENING MAMMOGRAM FOR BREAST CANCER: ICD-10-CM

## 2023-08-11 DIAGNOSIS — Z00.00 ANNUAL PHYSICAL EXAM: Primary | ICD-10-CM

## 2023-08-11 DIAGNOSIS — D50.9 IRON DEFICIENCY ANEMIA, UNSPECIFIED IRON DEFICIENCY ANEMIA TYPE: ICD-10-CM

## 2023-08-11 DIAGNOSIS — F11.20 CONTINUOUS OPIOID DEPENDENCE (HCC): ICD-10-CM

## 2023-08-11 PROBLEM — E11.9 DIABETES MELLITUS, TYPE II (HCC): Status: ACTIVE | Noted: 2021-02-15

## 2023-08-11 LAB
LEFT EYE DIABETIC RETINOPATHY: NORMAL
LEFT EYE IMAGE QUALITY: NORMAL
LEFT EYE MACULAR EDEMA: NORMAL
LEFT EYE OTHER RETINOPATHY: NORMAL
RIGHT EYE DIABETIC RETINOPATHY: NORMAL
RIGHT EYE IMAGE QUALITY: NORMAL
RIGHT EYE MACULAR EDEMA: NORMAL
RIGHT EYE OTHER RETINOPATHY: NORMAL
SEVERITY (EYE EXAM): NORMAL

## 2023-08-11 PROCEDURE — 99386 PREV VISIT NEW AGE 40-64: CPT | Performed by: FAMILY MEDICINE

## 2023-08-11 RX ORDER — SODIUM CHLORIDE 9 MG/ML
20 INJECTION, SOLUTION INTRAVENOUS ONCE
OUTPATIENT
Start: 2023-08-11

## 2023-08-11 NOTE — ASSESSMENT & PLAN NOTE
Patient currently on lisinopril 20 mg twice daily and amlodipine 5 mg twice daily. This seems to be controlling her blood pressures.

## 2023-08-11 NOTE — ASSESSMENT & PLAN NOTE
Lab Results   Component Value Date    HGBA1C 6.6 (H) 07/14/2023   Currently on Janumet  once daily  Continue lisinopril  Currently not on statin medication  Eye exam completed today  Diabetic foot exam completed today

## 2023-08-11 NOTE — PROGRESS NOTES
Name: Ventura Kruse      : 1975      MRN: [id number]  Encounter Provider: Ofelia Melendez MD  Encounter Date: 2023   Encounter department: [address] [place] [address]     1. Annual physical exam    2. Other specified diabetes mellitus with other specified complication, unspecified whether long term insulin use ([address])  -     IRIS Diabetic eye exam    3. Continuous opioid dependence ([address])  Assessment & Plan:  Follows with pain management in [place]. Remains on Suboxone and Subutex      4. Type 2 diabetes mellitus with hyperglycemia, without long-term current use of insulin [facility])  Assessment & Plan:    Lab Results   Component Value Date    HGBA1C 6.6 (H) 2023   Currently on Janumet  once daily  Continue lisinopril  Currently not on statin medication  Eye exam completed today  Diabetic foot exam completed today      5. Iron deficiency anemia, unspecified iron deficiency anemia type  -     Iron Panel (Includes Ferritin, Iron Sat%, Iron, and TIBC); Future    6. Essential hypertension  Assessment & Plan:  Patient currently on lisinopril 20 mg twice daily and amlodipine 5 mg twice daily. This seems to be controlling her blood pressures. 7. Uncontrolled type 2 diabetes mellitus with hyperglycemia [facility])  Assessment & Plan:    Lab Results   Component Value Date    HGBA1C 6.6 (H) 2023   Currently on Janumet  once daily  Continue lisinopril  Currently not on statin medication  Eye exam completed today  Diabetic foot exam completed today    Orders:  -     CBC and differential; Future  -     Comprehensive metabolic panel; Future  -     TSH, 3rd generation with Free T4 reflex; Future  -     Lipid panel; Future    8. Encounter for screening mammogram for breast cancer  -     Mammo screening bilateral w 3d & cad; Future; Expected date: 2023    9. Screening for cervical cancer  -     Ambulatory referral to Obstetrics / Gynecology; Future    10.  Screening for colorectal cancer  -     Ambulatory referral to Gastroenterology; Future    Iron infusion ordered  Orders above  Continue following with pain management  Follow-up in 6 months for recheck       Subjective      New patient physical.  Past medical history of uncontrolled type 2 diabetes, hypertension, opioid dependence, iron deficiency anemia. Last period was march. No periods since. Does not have an obgyn. Unable to take oral iron. Last infusion was febryary. Heme previously followed. Dr. Frankie Cabrera hematology. No longer following. Patient wasn't happy with their answers. Previously followed with Dr. Deniz Hopper at [facility] urgent care. Follows with pain management in [place]. Was told this is an autoimmune disease. Had a complete autoimmune workup completed. Non specific connective tissue disorder. Was on oxycodone 15mg 4-5x daily previously. Transitioned to suboxone 4 years ago. Feels beat up constantly. Review of Systems   Constitutional: Positive for fatigue. Negative for fever. HENT: Negative for sore throat. Eyes: Negative for visual disturbance. Respiratory: Negative for cough, chest tightness and shortness of breath. Cardiovascular: Negative for chest pain, palpitations and leg swelling. Gastrointestinal: Negative for abdominal pain, constipation, diarrhea and nausea. Endocrine: Negative for cold intolerance and heat intolerance. Genitourinary: Negative for flank pain. Musculoskeletal: Negative for back pain and neck pain. Skin: Negative for rash. Neurological: Negative for headaches. Psychiatric/Behavioral: Negative for behavioral problems and confusion. Current Outpatient Medications on File Prior to Visit   Medication Sig   • amLODIPine (NORVASC) 5 mg tablet Take 5 mg by mouth 2 (two) times a day As directed   • buprenorphine (SUBUTEX) 8 mg TAKE 3-3.5 TABLETS UNDER THE TONGUE EVERY DAY AS NEEDED FOR 30 DAYS. DO NOT COMBINE WITH OTHER OPIOIDS.  CAUTION WITH OTHER PSYCHOTROPIC DRUG   • buprenorphine-naloxone (SUBOXONE) 8-2 mg per SL tablet Place 1 tablet under the tongue 2 (two) times a day   • [facility] COVID-19 Rapid Test KIT    • lisinopril (ZESTRIL) 20 mg tablet Take 20 mg by mouth 2 (two) times a day   • ondansetron (ZOFRAN) 4 mg tablet TAKE 1 TO 2 TABLETS BY MOUTH EVERY 12 HOURS AS NEEDED FOR NAUSEA, TAKEN 30 MINUTES BEFORE BUPRENORPHINE DOSING FOR 15 DAYS, USE LOWEST EFFEC   • sitaGLIPtin-metFORMIN (JANUMET)  MG per tablet Take 1 tablet by mouth daily   • traMADol (ULTRAM) 50 mg tablet Take 50 mg by mouth every 6 (six) hours as needed for moderate pain   • [DISCONTINUED] famotidine (PEPCID) 20 mg tablet Take 1 tablet (20 mg total) by mouth 2 (two) times a day   • [DISCONTINUED] lisinopril (ZESTRIL) 10 mg tablet Take 20 mg by mouth daily   • [DISCONTINUED] pantoprazole (PROTONIX) 40 mg tablet Take 1 tablet (40 mg total) by mouth daily for 10 days       Objective     /94 (BP Location: Left arm, Patient Position: Sitting, Cuff Size: Standard)   Pulse 78   Temp 97.8 °F (36.6 °C) (Tympanic)   Resp 18   Ht 5' 3.7" (1.618 m)   Wt 93.4 kg (206 lb)   LMP  (LMP Unknown)   SpO2 97%   BMI 35.69 kg/m²     Physical Exam  Vitals and nursing note reviewed. Constitutional:       Appearance: She is well-developed. HENT:      Head: Normocephalic and atraumatic. Cardiovascular:      Rate and Rhythm: Normal rate and regular rhythm. Pulses: no weak pulses  Pulmonary:      Effort: Pulmonary effort is normal.      Breath sounds: Normal breath sounds. Feet:      Right foot:      Skin integrity: No ulcer, skin breakdown, erythema, warmth, callus or dry skin. Left foot:      Skin integrity: No ulcer, skin breakdown, erythema, warmth, callus or dry skin. Neurological:      General: No focal deficit present. Mental Status: She is alert.    Psychiatric:         Mood and Affect: Mood normal.         Behavior: Behavior normal.          Patient's shoes and socks removed. Right Foot/Ankle   Right Foot Inspection  Skin Exam: skin normal and skin intact. No dry skin, no warmth, no callus, no erythema, no maceration, no abnormal color, no pre-ulcer, no ulcer and no callus. Left Foot/Ankle  Left Foot Inspection  Skin Exam: skin normal and skin intact. No dry skin, no warmth, no erythema, no maceration, normal color, no pre-ulcer, no ulcer and no callus.      Assign Risk Category  No deformity present  No loss of protective sensation  No weak pulses  Risk: 0      Neelima Caba MD

## 2023-11-13 ENCOUNTER — APPOINTMENT (OUTPATIENT)
Dept: LAB | Facility: MEDICAL CENTER | Age: 48
End: 2023-11-13
Payer: COMMERCIAL

## 2023-11-13 DIAGNOSIS — D64.9 ANEMIA, UNSPECIFIED TYPE: ICD-10-CM

## 2023-11-13 DIAGNOSIS — E11.9 DIABETES MELLITUS WITHOUT COMPLICATION (HCC): ICD-10-CM

## 2023-11-13 DIAGNOSIS — R53.83 FATIGUE, UNSPECIFIED TYPE: ICD-10-CM

## 2023-11-13 LAB
ALBUMIN SERPL BCP-MCNC: 4.1 G/DL (ref 3.5–5)
ALP SERPL-CCNC: 43 U/L (ref 34–104)
ALT SERPL W P-5'-P-CCNC: 23 U/L (ref 7–52)
ANION GAP SERPL CALCULATED.3IONS-SCNC: 8 MMOL/L
AST SERPL W P-5'-P-CCNC: 23 U/L (ref 13–39)
BASOPHILS # BLD AUTO: 0.04 THOUSANDS/ÂΜL (ref 0–0.1)
BASOPHILS NFR BLD AUTO: 1 % (ref 0–1)
BILIRUB SERPL-MCNC: 0.25 MG/DL (ref 0.2–1)
BUN SERPL-MCNC: 11 MG/DL (ref 5–25)
CALCIUM SERPL-MCNC: 9.4 MG/DL (ref 8.4–10.2)
CHLORIDE SERPL-SCNC: 101 MMOL/L (ref 96–108)
CO2 SERPL-SCNC: 25 MMOL/L (ref 21–32)
CREAT SERPL-MCNC: 0.59 MG/DL (ref 0.6–1.3)
EOSINOPHIL # BLD AUTO: 0.17 THOUSAND/ÂΜL (ref 0–0.61)
EOSINOPHIL NFR BLD AUTO: 3 % (ref 0–6)
ERYTHROCYTE [DISTWIDTH] IN BLOOD BY AUTOMATED COUNT: 16.1 % (ref 11.6–15.1)
EST. AVERAGE GLUCOSE BLD GHB EST-MCNC: 169 MG/DL
FERRITIN SERPL-MCNC: 5 NG/ML (ref 11–307)
GFR SERPL CREATININE-BSD FRML MDRD: 107 ML/MIN/1.73SQ M
GLUCOSE SERPL-MCNC: 153 MG/DL (ref 65–140)
HBA1C MFR BLD: 7.5 %
HCT VFR BLD AUTO: 33.1 % (ref 34.8–46.1)
HGB BLD-MCNC: 9.8 G/DL (ref 11.5–15.4)
IMM GRANULOCYTES # BLD AUTO: 0.02 THOUSAND/UL (ref 0–0.2)
IMM GRANULOCYTES NFR BLD AUTO: 0 % (ref 0–2)
IRON SATN MFR SERPL: 6 % (ref 15–50)
IRON SERPL-MCNC: 27 UG/DL (ref 50–212)
LYMPHOCYTES # BLD AUTO: 2.34 THOUSANDS/ÂΜL (ref 0.6–4.47)
LYMPHOCYTES NFR BLD AUTO: 47 % (ref 14–44)
MCH RBC QN AUTO: 21.4 PG (ref 26.8–34.3)
MCHC RBC AUTO-ENTMCNC: 29.6 G/DL (ref 31.4–37.4)
MCV RBC AUTO: 72 FL (ref 82–98)
MONOCYTES # BLD AUTO: 0.39 THOUSAND/ÂΜL (ref 0.17–1.22)
MONOCYTES NFR BLD AUTO: 8 % (ref 4–12)
NEUTROPHILS # BLD AUTO: 2.03 THOUSANDS/ÂΜL (ref 1.85–7.62)
NEUTS SEG NFR BLD AUTO: 41 % (ref 43–75)
NRBC BLD AUTO-RTO: 0 /100 WBCS
PLATELET # BLD AUTO: 331 THOUSANDS/UL (ref 149–390)
PMV BLD AUTO: 9.7 FL (ref 8.9–12.7)
POTASSIUM SERPL-SCNC: 5 MMOL/L (ref 3.5–5.3)
PROT SERPL-MCNC: 7.5 G/DL (ref 6.4–8.4)
RBC # BLD AUTO: 4.58 MILLION/UL (ref 3.81–5.12)
SODIUM SERPL-SCNC: 134 MMOL/L (ref 135–147)
TIBC SERPL-MCNC: 466 UG/DL (ref 250–450)
UIBC SERPL-MCNC: 439 UG/DL (ref 155–355)
WBC # BLD AUTO: 4.99 THOUSAND/UL (ref 4.31–10.16)

## 2023-11-13 PROCEDURE — 80053 COMPREHEN METABOLIC PANEL: CPT

## 2023-11-13 PROCEDURE — 83036 HEMOGLOBIN GLYCOSYLATED A1C: CPT

## 2023-11-13 PROCEDURE — 82728 ASSAY OF FERRITIN: CPT

## 2023-11-13 PROCEDURE — 36415 COLL VENOUS BLD VENIPUNCTURE: CPT

## 2023-11-13 PROCEDURE — 83540 ASSAY OF IRON: CPT

## 2023-11-13 PROCEDURE — 85025 COMPLETE CBC W/AUTO DIFF WBC: CPT

## 2023-11-13 PROCEDURE — 83550 IRON BINDING TEST: CPT

## 2024-01-05 ENCOUNTER — OFFICE VISIT (OUTPATIENT)
Dept: GASTROENTEROLOGY | Facility: CLINIC | Age: 49
End: 2024-01-05
Payer: COMMERCIAL

## 2024-01-05 ENCOUNTER — TELEPHONE (OUTPATIENT)
Dept: GASTROENTEROLOGY | Facility: CLINIC | Age: 49
End: 2024-01-05

## 2024-01-05 VITALS
HEIGHT: 64 IN | SYSTOLIC BLOOD PRESSURE: 130 MMHG | DIASTOLIC BLOOD PRESSURE: 78 MMHG | BODY MASS INDEX: 36.26 KG/M2 | TEMPERATURE: 98.2 F | WEIGHT: 212.4 LBS

## 2024-01-05 DIAGNOSIS — D50.0 IRON DEFICIENCY ANEMIA DUE TO CHRONIC BLOOD LOSS: Primary | ICD-10-CM

## 2024-01-05 PROCEDURE — 99204 OFFICE O/P NEW MOD 45 MIN: CPT | Performed by: INTERNAL MEDICINE

## 2024-01-05 RX ORDER — ERGOCALCIFEROL 1.25 MG/1
50000 CAPSULE ORAL WEEKLY
COMMUNITY
Start: 2023-12-22

## 2024-01-05 NOTE — PATIENT INSTRUCTIONS
Scheduled date of colonoscopy/EGD (as of today): 02/22/2024  Physician performing colonoscopy: Dr. Mccord   Location of colonoscopy: [facility]  Bowel prep reviewed with patient: Golytely   Instructions reviewed with patient by: Afua MEDINA   Clearances:  N/A    Pt is diabetic provided medication information sheet   Pt is currently on Janumet

## 2024-01-05 NOTE — PROGRESS NOTES
[facility] - Outpatient Consultation  Mckenna Felton 48 y.o. female MRN: [id number]  Encounter: [id number]          ASSESSMENT AND PLAN:      48-year-old female with iron deficiency anemia likely due to menorrhagia but need to rule out GI blood loss and malabsorption.  She is currently receiving iron infusion and follows with hematologist.  I will schedule her for EGD with gastric and duodenal biopsy to assess for H. pylori infection and celiac disease.  Colonoscopy to assess for colon polyps, advanced adenoma and malignancy as well as AVMs.  Will also check celiac panel    Follow-up in the office in 4 months.  ______________________________________________________________________    HPI: 48-year-old female with history of menorrhagia and chronic iron deficiency anemia here for colonoscopy consult.  She was diagnosed with iron deficiency anemia in 2015.  She has been receiving iron infusions intermittently.  Last admission to the hospital was in 2021 where she required transfusion of 2 units packed red blood cells.  Currently she sees Dr. Wiggins hematologist and receiving Venofer infusion.      Most recent labs - Iron saturation 6, TIBC 446, Iron 27 Ferritin - 5       Hemoglobin 9.8 ( It was down to 5.7 in 2015)   Difficulty of taking PO iron supplements           [place] colon[place]  She had EGD several years ago     She has no melena, hematochezia, tang abdominal pain  No family history of GI malignancy or celiac disease    REVIEW OF SYSTEMS:    CONSTITUTIONAL: Denies any fever, chills, rigors, and weight loss.  HEENT: No earache or tinnitus. Denies hearing loss or visual disturbances.  CARDIOVASCULAR: No chest pain or palpitations.   RESPIRATORY: Denies any cough, hemoptysis, shortness of breath or dyspnea on exertion.  GASTROINTESTINAL: As noted in the History of Present Illness.   GENITOURINARY: No problems with urination. Denies any hematuria or dysuria.  NEUROLOGIC:  No dizziness or vertigo, denies headaches.   MUSCULOSKELETAL: Denies any muscle or joint pain.   SKIN: Denies skin rashes or itching.   ENDOCRINE: Denies excessive thirst. Denies intolerance to heat or cold.  PSYCHOSOCIAL: Denies depression or anxiety. Denies any recent memory loss.       Historical Information   Past Medical History:   Diagnosis Date    Connective tissue disorder (HCC)     non-specific    Degenerative disc disease, lumbar     Diabetes mellitus (HCC)     Gastritis     Hypertension      Past Surgical History:   Procedure Laterality Date     SECTION       SECTION       Social History   Social History     Substance and Sexual Activity   Alcohol Use Not Currently     Social History     Substance and Sexual Activity   Drug Use No     Social History     Tobacco Use   Smoking Status Former    Types: Cigarettes   Smokeless Tobacco Never     No family history on file.    Meds/Allergies       Current Outpatient Medications:     amLODIPine (NORVASC) 5 mg tablet    buprenorphine (SUBUTEX) 8 mg    buprenorphine-naloxone (SUBOXONE) 8-2 mg per SL tablet    [facility] COVID-19 Rapid Test KIT    lisinopril (ZESTRIL) 20 mg tablet    ondansetron (ZOFRAN) 4 mg tablet    sitaGLIPtin-metFORMIN (JANUMET)  MG per tablet    traMADol (ULTRAM) 50 mg tablet    Allergies   Allergen Reactions    Metoclopramide Shortness Of Breath    Ketorolac Tromethamine GI Intolerance    Morphine And Related     Penicillins     Sulfa Antibiotics     Sulfa Antibiotics Hives           Objective     There were no vitals taken for this visit. There is no height or weight on file to calculate BMI.        PHYSICAL EXAM:      General Appearance:   Alert, cooperative, no distress   HEENT:   Normocephalic, atraumatic, anicteric.     Neck:  Supple, symmetrical, trachea midline   Lungs:   Clear to auscultation bilaterally; no rales, rhonchi or wheezing; respirations unlabored    Heart::   Regular rate and rhythm; no murmur, rub, or  gallop.   Abdomen:   Soft, non-tender, non-distended; normal bowel sounds; no masses, no organomegaly    Genitalia:   Deferred    Rectal:   Deferred    Extremities:  No cyanosis, clubbing or edema    Pulses:  2+ and symmetric    Skin:  No jaundice, rashes, or lesions    Lymph nodes:  No palpable cervical lymphadenopathy        Lab Results:   No visits with results within 1 Day(s) from this visit.   Latest known visit with results is:   Appointment on 11/13/2023   Component Date Value    WBC 11/13/2023 4.99     RBC 11/13/2023 4.58     Hemoglobin 11/13/2023 9.8 (L)     Hematocrit 11/13/2023 33.1 (L)     MCV 11/13/2023 72 (L)     MCH 11/13/2023 21.4 (L)     MCHC 11/13/2023 29.6 (L)     RDW 11/13/2023 16.1 (H)     MPV 11/13/2023 9.7     Platelets 11/13/2023 331     nRBC 11/13/2023 0     Neutrophils Relative 11/13/2023 41 (L)     Immat GRANS % 11/13/2023 0     Lymphocytes Relative 11/13/2023 47 (H)     Monocytes Relative 11/13/2023 8     Eosinophils Relative 11/13/2023 3     Basophils Relative 11/13/2023 1     Neutrophils Absolute 11/13/2023 2.03     Immature Grans Absolute 11/13/2023 0.02     Lymphocytes Absolute 11/13/2023 2.34     Monocytes Absolute 11/13/2023 0.39     Eosinophils Absolute 11/13/2023 0.17     Basophils Absolute 11/13/2023 0.04     Sodium 11/13/2023 134 (L)     Potassium 11/13/2023 5.0     Chloride 11/13/2023 101     CO2 11/13/2023 25     ANION GAP 11/13/2023 8     BUN 11/13/2023 11     Creatinine 11/13/2023 0.59 (L)     Glucose 11/13/2023 153 (H)     Calcium 11/13/2023 9.4     AST 11/13/2023 23     ALT 11/13/2023 23     Alkaline Phosphatase 11/13/2023 43     Total Protein 11/13/2023 7.5     Albumin 11/13/2023 4.1     Total Bilirubin 11/13/2023 0.25     eGFR 11/13/2023 107     Hemoglobin A1C 11/13/2023 7.5 (H)     EAG 11/13/2023 169     Iron Saturation 11/13/2023 6 (L)     TIBC 11/13/2023 466 (H)     Iron 11/13/2023 27 (L)     UIBC 11/13/2023 439 (H)     Ferritin 11/13/2023 5 (L)          Radiology  Results:   No results found.

## 2024-01-10 ENCOUNTER — HOSPITAL ENCOUNTER (EMERGENCY)
Facility: HOSPITAL | Age: 49
Discharge: HOME | End: 2024-01-10
Attending: EMERGENCY MEDICINE
Payer: COMMERCIAL

## 2024-01-10 ENCOUNTER — APPOINTMENT (EMERGENCY)
Dept: CT IMAGING | Facility: HOSPITAL | Age: 49
End: 2024-01-10
Payer: COMMERCIAL

## 2024-01-10 VITALS
TEMPERATURE: 98 F | OXYGEN SATURATION: 94 % | DIASTOLIC BLOOD PRESSURE: 70 MMHG | RESPIRATION RATE: 18 BRPM | HEART RATE: 89 BPM | SYSTOLIC BLOOD PRESSURE: 146 MMHG

## 2024-01-10 DIAGNOSIS — K59.00 CONSTIPATION: Primary | ICD-10-CM

## 2024-01-10 DIAGNOSIS — K57.92 DIVERTICULITIS: ICD-10-CM

## 2024-01-10 LAB
ANION GAP SERPL CALCULATED.3IONS-SCNC: 9 MMOL/L
BACTERIA UR QL AUTO: ABNORMAL /HPF
BASOPHILS # BLD AUTO: 0.02 THOUSANDS/ÂΜL (ref 0–0.1)
BASOPHILS NFR BLD AUTO: 0 % (ref 0–1)
BILIRUB UR QL STRIP: NEGATIVE
BUN SERPL-MCNC: 7 MG/DL (ref 5–25)
CALCIUM SERPL-MCNC: 8.7 MG/DL (ref 8.4–10.2)
CHLORIDE SERPL-SCNC: 100 MMOL/L (ref 96–108)
CLARITY UR: ABNORMAL
CO2 SERPL-SCNC: 26 MMOL/L (ref 21–32)
COLOR UR: ABNORMAL
CREAT SERPL-MCNC: 0.43 MG/DL (ref 0.6–1.3)
EOSINOPHIL # BLD AUTO: 0.07 THOUSAND/ÂΜL (ref 0–0.61)
EOSINOPHIL NFR BLD AUTO: 1 % (ref 0–6)
ERYTHROCYTE [DISTWIDTH] IN BLOOD BY AUTOMATED COUNT: 21.6 % (ref 11.6–15.1)
EXT PREGNANCY TEST URINE: NEGATIVE
EXT. CONTROL: NORMAL
GFR SERPL CREATININE-BSD FRML MDRD: 119 ML/MIN/1.73SQ M
GLUCOSE SERPL-MCNC: 190 MG/DL (ref 65–140)
GLUCOSE UR STRIP-MCNC: ABNORMAL MG/DL
HCT VFR BLD AUTO: 33 % (ref 34.8–46.1)
HGB BLD-MCNC: 9.6 G/DL (ref 11.5–15.4)
HGB UR QL STRIP.AUTO: ABNORMAL
HYALINE CASTS #/AREA URNS LPF: ABNORMAL /LPF
IMM GRANULOCYTES # BLD AUTO: 0.06 THOUSAND/UL (ref 0–0.2)
IMM GRANULOCYTES NFR BLD AUTO: 1 % (ref 0–2)
KETONES UR STRIP-MCNC: ABNORMAL MG/DL
LEUKOCYTE ESTERASE UR QL STRIP: ABNORMAL
LIPASE SERPL-CCNC: 8 U/L (ref 11–82)
LYMPHOCYTES # BLD AUTO: 1.23 THOUSANDS/ÂΜL (ref 0.6–4.47)
LYMPHOCYTES NFR BLD AUTO: 12 % (ref 14–44)
MCH RBC QN AUTO: 21.7 PG (ref 26.8–34.3)
MCHC RBC AUTO-ENTMCNC: 29.1 G/DL (ref 31.4–37.4)
MCV RBC AUTO: 75 FL (ref 82–98)
MONOCYTES # BLD AUTO: 0.84 THOUSAND/ÂΜL (ref 0.17–1.22)
MONOCYTES NFR BLD AUTO: 8 % (ref 4–12)
MUCOUS THREADS UR QL AUTO: ABNORMAL
NEUTROPHILS # BLD AUTO: 8.19 THOUSANDS/ÂΜL (ref 1.85–7.62)
NEUTS SEG NFR BLD AUTO: 78 % (ref 43–75)
NITRITE UR QL STRIP: NEGATIVE
NON-SQ EPI CELLS URNS QL MICRO: ABNORMAL /HPF
NRBC BLD AUTO-RTO: 0 /100 WBCS
PH UR STRIP.AUTO: 7 [PH]
PLATELET # BLD AUTO: 247 THOUSANDS/UL (ref 149–390)
PMV BLD AUTO: 9.1 FL (ref 8.9–12.7)
POTASSIUM SERPL-SCNC: 3 MMOL/L (ref 3.5–5.3)
PROT UR STRIP-MCNC: ABNORMAL MG/DL
RBC # BLD AUTO: 4.42 MILLION/UL (ref 3.81–5.12)
RBC #/AREA URNS AUTO: ABNORMAL /HPF
SODIUM SERPL-SCNC: 135 MMOL/L (ref 135–147)
SP GR UR STRIP.AUTO: 1.02 (ref 1–1.03)
UROBILINOGEN UR STRIP-ACNC: <2 MG/DL
WBC # BLD AUTO: 10.41 THOUSAND/UL (ref 4.31–10.16)
WBC #/AREA URNS AUTO: ABNORMAL /HPF

## 2024-01-10 PROCEDURE — 81001 URINALYSIS AUTO W/SCOPE: CPT | Performed by: EMERGENCY MEDICINE

## 2024-01-10 PROCEDURE — 99285 EMERGENCY DEPT VISIT HI MDM: CPT | Performed by: EMERGENCY MEDICINE

## 2024-01-10 PROCEDURE — 83690 ASSAY OF LIPASE: CPT | Performed by: EMERGENCY MEDICINE

## 2024-01-10 PROCEDURE — 85025 COMPLETE CBC W/AUTO DIFF WBC: CPT | Performed by: EMERGENCY MEDICINE

## 2024-01-10 PROCEDURE — 74177 CT ABD & PELVIS W/CONTRAST: CPT

## 2024-01-10 PROCEDURE — 80048 BASIC METABOLIC PNL TOTAL CA: CPT | Performed by: EMERGENCY MEDICINE

## 2024-01-10 PROCEDURE — 99284 EMERGENCY DEPT VISIT MOD MDM: CPT

## 2024-01-10 PROCEDURE — 81025 URINE PREGNANCY TEST: CPT | Performed by: EMERGENCY MEDICINE

## 2024-01-10 PROCEDURE — G1004 CDSM NDSC: HCPCS

## 2024-01-10 PROCEDURE — 96374 THER/PROPH/DIAG INJ IV PUSH: CPT

## 2024-01-10 PROCEDURE — 96361 HYDRATE IV INFUSION ADD-ON: CPT

## 2024-01-10 PROCEDURE — 36415 COLL VENOUS BLD VENIPUNCTURE: CPT | Performed by: EMERGENCY MEDICINE

## 2024-01-10 RX ORDER — METRONIDAZOLE 500 MG/1
500 TABLET ORAL ONCE
Status: COMPLETED | OUTPATIENT
Start: 2024-01-10 | End: 2024-01-10

## 2024-01-10 RX ORDER — CIPROFLOXACIN 500 MG/1
500 TABLET, FILM COATED ORAL ONCE
Status: COMPLETED | OUTPATIENT
Start: 2024-01-10 | End: 2024-01-10

## 2024-01-10 RX ORDER — POTASSIUM CHLORIDE 20 MEQ/1
40 TABLET, EXTENDED RELEASE ORAL ONCE
Status: COMPLETED | OUTPATIENT
Start: 2024-01-10 | End: 2024-01-10

## 2024-01-10 RX ORDER — ONDANSETRON 2 MG/ML
4 INJECTION INTRAMUSCULAR; INTRAVENOUS ONCE
Status: COMPLETED | OUTPATIENT
Start: 2024-01-10 | End: 2024-01-10

## 2024-01-10 RX ORDER — MAGNESIUM CARB/ALUMINUM HYDROX 105-160MG
296 TABLET,CHEWABLE ORAL ONCE
Qty: 296 ML | Refills: 0 | Status: SHIPPED | OUTPATIENT
Start: 2024-01-10 | End: 2024-01-10

## 2024-01-10 RX ORDER — METRONIDAZOLE 500 MG/1
500 TABLET ORAL EVERY 8 HOURS SCHEDULED
Qty: 30 TABLET | Refills: 0 | Status: SHIPPED | OUTPATIENT
Start: 2024-01-10 | End: 2024-01-21

## 2024-01-10 RX ORDER — CIPROFLOXACIN 500 MG/1
500 TABLET, FILM COATED ORAL 2 TIMES DAILY
Qty: 20 TABLET | Refills: 0 | Status: SHIPPED | OUTPATIENT
Start: 2024-01-10 | End: 2024-01-21

## 2024-01-10 RX ADMIN — IOHEXOL 100 ML: 350 INJECTION, SOLUTION INTRAVENOUS at 07:54

## 2024-01-10 RX ADMIN — POTASSIUM CHLORIDE 40 MEQ: 1500 TABLET, EXTENDED RELEASE ORAL at 09:15

## 2024-01-10 RX ADMIN — METRONIDAZOLE 500 MG: 500 TABLET ORAL at 09:15

## 2024-01-10 RX ADMIN — CIPROFLOXACIN HYDROCHLORIDE 500 MG: 500 TABLET, FILM COATED ORAL at 09:15

## 2024-01-10 RX ADMIN — SODIUM CHLORIDE 1000 ML: 0.9 INJECTION, SOLUTION INTRAVENOUS at 06:54

## 2024-01-10 RX ADMIN — ONDANSETRON 4 MG: 2 INJECTION INTRAMUSCULAR; INTRAVENOUS at 06:54

## 2024-01-10 NOTE — ED PROVIDER NOTES
History  Chief Complaint   Patient presents with    Constipation     Presents with constipation, no BM since Friday. Has been getting iron infusions for anemia and had double dose last week. Has tried OTC laxatives, two fleet enemas and lubiprostone with no relief. Has lower abdominal pain and nausea, had 4 mg odt zofran around 0400       Constipation  Associated symptoms: abdominal pain and nausea    Associated symptoms: no diarrhea, no dysuria, no fever and no vomiting      48-year-old female presenting to emergency department due to constipation for the last 5 days.  Nausea.  No vomiting.  Abdominal distention.  No urinary complaints.  No chest pain or shortness of breath.  No fevers or chills.  No lightness or dizziness.  History of .  On Suboxone.  Recent iron infusions.  Has tried enema and MiraLAX and milk of magnesia at home.      Prior to Admission Medications   Prescriptions Last Dose Informant Patient Reported? Taking?   [facility] COVID-19 Rapid Test KIT  Self Yes No   amLODIPine (NORVASC) 5 mg tablet  Self Yes No   Sig: Take 5 mg by mouth 2 (two) times a day As directed   buprenorphine (SUBUTEX) 8 mg  Self Yes No   Sig: TAKE 3-3.5 TABLETS UNDER THE TONGUE EVERY DAY AS NEEDED FOR 30 DAYS. DO NOT COMBINE WITH OTHER OPIOIDS. CAUTION WITH OTHER PSYCHOTROPIC DRUG   buprenorphine-naloxone (SUBOXONE) 8-2 mg per SL tablet  Self Yes No   Sig: Place 1 tablet under the tongue 2 (two) times a day   ergocalciferol (VITAMIN D2) 50,000 units  Self Yes No   Sig: Take 50,000 Units by mouth once a week   lisinopril (ZESTRIL) 20 mg tablet  Self Yes No   Sig: Take 20 mg by mouth 2 (two) times a day   ondansetron (ZOFRAN) 4 mg tablet  Self Yes No   Sig: TAKE 1 TO 2 TABLETS BY MOUTH EVERY 12 HOURS AS NEEDED FOR NAUSEA, TAKEN 30 MINUTES BEFORE BUPRENORPHINE DOSING FOR 15 DAYS, USE LOWEST EFFEC   polyethylene glycol (GOLYTELY) 4000 mL solution   No No   Sig: Take 4,000 mL by mouth once for 1 dose   sitaGLIPtin-metFORMIN  (JANUMET)  MG per tablet  Self Yes No   Sig: Take 1 tablet by mouth daily   traMADol (ULTRAM) 50 mg tablet  Self Yes No   Sig: Take 50 mg by mouth every 6 (six) hours as needed for moderate pain      Facility-Administered Medications: None       Past Medical History:   Diagnosis Date    Connective tissue disorder (HCC)     non-specific    Degenerative disc disease, lumbar     Diabetes mellitus (HCC)     Gastritis     Hypertension        Past Surgical History:   Procedure Laterality Date     SECTION       SECTION         History reviewed. No pertinent family history.  I have reviewed and agree with the history as documented.    E-Cigarette/Vaping    E-Cigarette Use Never User      E-Cigarette/Vaping Substances     Social History     Tobacco Use    Smoking status: Former     Types: Cigarettes    Smokeless tobacco: Never   Vaping Use    Vaping status: Never Used   Substance Use Topics    Alcohol use: Not Currently    Drug use: No       Review of Systems   Constitutional:  Negative for chills, diaphoresis and fever.   HENT:  Negative for congestion and sore throat.    Respiratory:  Negative for cough, shortness of breath, wheezing and stridor.    Cardiovascular:  Negative for chest pain, palpitations and leg swelling.   Gastrointestinal:  Positive for abdominal pain, constipation and nausea. Negative for blood in stool, diarrhea and vomiting.   Genitourinary:  Negative for dysuria, frequency and urgency.   Musculoskeletal:  Negative for neck stiffness.   Skin:  Negative for pallor and rash.   Neurological:  Negative for dizziness, syncope, weakness, light-headedness and headaches.   All other systems reviewed and are negative.      Physical Exam  Physical Exam  Vitals reviewed.   Constitutional:       Appearance: Normal appearance. She is well-developed.   HENT:      Head: Normocephalic and atraumatic.   Eyes:      Extraocular Movements: Extraocular movements intact.      Pupils: Pupils are equal,  round, and reactive to light.   Cardiovascular:      Rate and Rhythm: Normal rate and regular rhythm.      Heart sounds: Normal heart sounds.   Pulmonary:      Effort: Pulmonary effort is normal. No respiratory distress.      Breath sounds: Normal breath sounds.   Abdominal:      General: Bowel sounds are normal. There is distension.      Palpations: Abdomen is soft.      Tenderness: There is no abdominal tenderness.   Musculoskeletal:         General: No swelling or tenderness. Normal range of motion.      Cervical back: Neck supple.   Skin:     General: Skin is warm and dry.      Capillary Refill: Capillary refill takes less than 2 seconds.   Neurological:      General: No focal deficit present.      Mental Status: She is alert and oriented to person, place, and time.         Vital Signs  ED Triage Vitals   Temperature Pulse Respirations Blood Pressure SpO2   01/10/24 0622 01/10/24 0615 01/10/24 0615 01/10/24 0615 01/10/24 0615   98 °F (36.7 °C) 95 20 139/80 94 %      Temp Source Heart Rate Source Patient Position - Orthostatic VS BP Location FiO2 (%)   01/10/24 0622 01/10/24 0615 01/10/24 0615 01/10/24 0615 --   Oral Monitor Sitting Right arm       Pain Score       01/10/24 0615       10 - Worst Possible Pain           Vitals:    01/10/24 0615 01/10/24 0700 01/10/24 0915   BP: 139/80 130/70 146/70   Pulse: 95 94 89   Patient Position - Orthostatic VS: Sitting Sitting Sitting         Visual Acuity      ED Medications  Medications   sodium chloride 0.9 % bolus 1,000 mL (0 mL Intravenous Stopped 1/10/24 0832)   ondansetron (ZOFRAN) injection 4 mg (4 mg Intravenous Given 1/10/24 0654)   iohexol (OMNIPAQUE) 350 MG/ML injection (SINGLE-DOSE) 100 mL (100 mL Intravenous Given 1/10/24 0754)   ciprofloxacin (CIPRO) tablet 500 mg (500 mg Oral Given 1/10/24 0915)   metroNIDAZOLE (FLAGYL) tablet 500 mg (500 mg Oral Given 1/10/24 0915)   potassium chloride (K-DUR,KLOR-CON) CR tablet 40 mEq (40 mEq Oral Given 1/10/24 0915)        Diagnostic Studies  Results Reviewed       Procedure Component Value Units Date/Time    Urine Microscopic [[id number]]  (Abnormal) Collected: 01/10/24 0729    Lab Status: Final result Specimen: Urine, Clean Catch Updated: 01/10/24 0745     RBC, UA 10-20 /hpf      WBC, UA 4-10 /hpf      Epithelial Cells Moderate /hpf      Bacteria, UA Occasional /hpf      MUCUS THREADS Moderate     Hyaline Casts, UA 0-3 /lpf     UA w Reflex to Microscopic w Reflex to Culture [[id number]]  (Abnormal) Collected: 01/10/24 0729    Lab Status: Final result Specimen: Urine, Clean Catch Updated: 01/10/24 0742     Color, UA Light Yellow     Clarity, UA Turbid     Specific Gravity, UA 1.016     pH, UA 7.0     Leukocytes, UA Moderate     Nitrite, UA Negative     Protein, UA 30 (1+) mg/dl      Glucose, UA Trace mg/dl      Ketones, UA 40 (2+) mg/dl      Urobilinogen, UA <2.0 mg/dl      Bilirubin, UA Negative     Occult Blood, UA Moderate    Basic metabolic panel [[id number]]  (Abnormal) Collected: 01/10/24 0654    Lab Status: Final result Specimen: Blood from Arm, Right Updated: 01/10/24 0736     Sodium 135 mmol/L      Potassium 3.0 mmol/L      Chloride 100 mmol/L      CO2 26 mmol/L      ANION GAP 9 mmol/L      BUN 7 mg/dL      Creatinine 0.43 mg/dL      Glucose 190 mg/dL      Calcium 8.7 mg/dL      eGFR 119 ml/min/1.73sq m     Narrative:      National Kid[employer] Foundation guidelines for Chronic Kidney Disease (CKD):     Stage 1 with normal or high GFR (GFR > 90 mL/min/1.73 square meters)    Stage 2 Mild CKD (GFR = 60-89 mL/min/1.73 square meters)    Stage 3A Moderate CKD (GFR = 45-59 mL/min/1.73 square meters)    Stage 3B Moderate CKD (GFR = 30-44 mL/min/1.73 square meters)    Stage 4 Severe CKD (GFR = 15-29 mL/min/1.73 square meters)    Stage 5 End Stage CKD (GFR <15 mL/min/1.73 square meters)  Note: GFR calculation is accurate only with a steady state creatinine    Lipase [[id number]]  (Abnormal) Collected: 01/10/24 0654    Lab Status:  Final result Specimen: Blood from Arm, Right Updated: 01/10/24 0736     Lipase 8 u/L     POCT pregnancy, urine [[id number]]  (Normal) Resulted: 01/10/24 0726    Lab Status: Final result Updated: 01/10/24 0727     EXT Preg Test, Ur Negative     Control Valid    CBC and differential [[id number]]  (Abnormal) Collected: 01/10/24 0654    Lab Status: Final result Specimen: Blood from Arm, Right Updated: 01/10/24 0716     WBC 10.41 Thousand/uL      RBC 4.42 Million/uL      Hemoglobin 9.6 g/dL      Hematocrit 33.0 %      MCV 75 fL      MCH 21.7 pg      MCHC 29.1 g/dL      RDW 21.6 %      MPV 9.1 fL      Platelets 247 Thousands/uL      nRBC 0 /100 WBCs      Neutrophils Relative 78 %      Immat GRANS % 1 %      Lymphocytes Relative 12 %      Monocytes Relative 8 %      Eosinophils Relative 1 %      Basophils Relative 0 %      Neutrophils Absolute 8.19 Thousands/µL      Immature Grans Absolute 0.06 Thousand/uL      Lymphocytes Absolute 1.23 Thousands/µL      Monocytes Absolute 0.84 Thousand/µL      Eosinophils Absolute 0.07 Thousand/µL      Basophils Absolute 0.02 Thousands/µL                    CT abdomen pelvis with contrast   Final Result by Pavel Diamond MD (01/10 0839)         1. Moderate amount of fecal material in the colon to the level of the proximal-mid sigmoid consistent with constipation with possible small fecaloma. Mild bowel wall thickening and pericolonic inflammatory stranding noted in this location where there are    also diverticula. Findings most likely reflect mild uncomplicated diverticulitis and less likely, stercoral colitis.   2. Additional findings as noted.      The study was marked in EPIC for immediate notification.            Workstation performed: [id number]                    Procedures  Procedures         ED Course                                             Medical Decision Making  48-year-old with constipation versus ileus versus obstruction.  Will check abdominal labs, CT, urine  preg.      Discussed with patient.  She does not want an enema or treatment for constipation here.  Would like to try soapsuds enema at home.  Also will take mag citrate at home.    Mild diverticulitis noted.  Penicillin allergic.  Does not want cephalosporins.  Will start Cipro Flagyl.  Patient understands the warnings associated with Cipro.    Amount and/or Complexity of Data Reviewed  Labs: ordered.  Radiology: ordered.    Risk  OTC drugs.  Prescription drug management.             Disposition  Final diagnoses:   Constipation   Diverticulitis     Time reflects when diagnosis was documented in both MDM as applicable and the Disposition within this note       Time User Action Codes Description Comment    1/10/2024  9:03 AM Cherie Samuels [K59.00] Constipation     1/10/2024  9:03 AM Cherie Samuels [K57.92] Diverticulitis           ED Disposition       ED Disposition   Discharge    Condition   Stable    Date/Time   Wed Porfirio 10, 2024  9:03 AM    Comment   Mckenna Jose Francisco discharge to home/self care.                   Follow-up Information       Follow up With Specialties Details Why Contact Info Additional Information    Monae Albarran MD Family Medicine Schedule an appointment as soon as possible for a visit   [address] [facility] [address]  [phone]       [facility] Emergency Department Emergency Medicine  As needed, If symptoms worsen [address] [facility] [address]  [phone] [facility] Emergency Department, [address] [place], [place], [address]    [facility] Schedule an appointment as soon as possible for a visit  constipation [address]  [address]  [facility] [address] [phone] [facility] [place], [address], [place], [place], [address] [phone]            Discharge Medication List as of  1/10/2024  9:05 AM        START taking these medications    Details   ciprofloxacin (CIPRO) 500 mg tablet Take 1 tablet (500 mg total) by mouth 2 (two) times a day for 10 days, Starting Wed 1/10/2024, Until Sat 1/20/2024, Normal      magnesium citrate (CITROMA) 1.745 g/30 mL oral solution Take 296 mL by mouth once for 1 dose, Starting Wed 1/10/2024, Normal      metroNIDAZOLE (FLAGYL) 500 mg tablet Take 1 tablet (500 mg total) by mouth every 8 (eight) hours for 10 days, Starting Wed 1/10/2024, Until Sat 1/20/2024, Normal           CONTINUE these medications which have NOT CHANGED    Details   amLODIPine (NORVASC) 5 mg tablet Take 5 mg by mouth 2 (two) times a day As directed, Starting Mon 4/24/2023, Historical Med      buprenorphine (SUBUTEX) 8 mg TAKE 3-3.5 TABLETS UNDER THE TONGUE EVERY DAY AS NEEDED FOR 30 DAYS. DO NOT COMBINE WITH OTHER OPIOIDS. CAUTION WITH OTHER PSYCHOTROPIC DRUG, Historical Med      buprenorphine-naloxone (SUBOXONE) 8-2 mg per SL tablet Place 1 tablet under the tongue 2 (two) times a day, Historical Med      ergocalciferol (VITAMIN D2) 50,000 units Take 50,000 Units by mouth once a week, Starting Fri 12/22/2023, Historical Med      [facility] COVID-19 Rapid Test KIT Starting Wed 5/3/2023, Historical Med      lisinopril (ZESTRIL) 20 mg tablet Take 20 mg by mouth 2 (two) times a day, Historical Med      ondansetron (ZOFRAN) 4 mg tablet TAKE 1 TO 2 TABLETS BY MOUTH EVERY 12 HOURS AS NEEDED FOR NAUSEA, TAKEN 30 MINUTES BEFORE BUPRENORPHINE DOSING FOR 15 DAYS, USE LOWEST EFFEC, Historical Med      polyethylene glycol (GOLYTELY) 4000 mL solution Take 4,000 mL by mouth once for 1 dose, Starting Fri 1/5/2024, Normal      sitaGLIPtin-metFORMIN (JANUMET)  MG per tablet Take 1 tablet by mouth daily, Historical Med      traMADol (ULTRAM) 50 mg tablet Take 50 mg by mouth every 6 (six) hours as needed for moderate pain, Historical Med             No discharge procedures on file.    PDMP Review          Value Time User    PDMP Reviewed  Yes 2/17/2021 12:15 PM Mamadou Sweet DO            ED Provider  Electronically Signed by             Cherie Samuels MD  01/10/24 1400

## 2024-01-10 NOTE — DISCHARGE INSTRUCTIONS
Please use mag citrate and soapsuds enema to help with constipation.  Take antibiotics for diverticulitis.  Return to ER for any new or worsening symptoms.

## 2024-01-11 ENCOUNTER — NURSE TRIAGE (OUTPATIENT)
Age: 49
End: 2024-01-11

## 2024-01-11 ENCOUNTER — TELEPHONE (OUTPATIENT)
Age: 49
End: 2024-01-11

## 2024-01-11 NOTE — TELEPHONE ENCOUNTER
Patients GI provider:  Dr. Mccord    Number to return call:  [phone]    Reason for call: Pt calling calling stating she hasn't gone to the bathroom since Friday(1/5/2024).  She also had a ct scan with results stating she is mpacted at the top./ Explained I would need to forwards her call to one of our nurse    Scheduled procedure/appointment date if applicable: 2/22/2024- EGD AND COLONOSCOPY

## 2024-01-11 NOTE — TELEPHONE ENCOUNTER
"LOV 01/05/24 w/, EGD/Colon 02/22/24 w/      Pt reports constipation with last BM passed on 01/05/24. Pt sought evaluation at ED on 01/10/24. She received NaCl liter bolus, K+ repleated with 40Meq, Flagyl + Cipro for elevated white count. CT revealed:     \"Moderate amount of fecal material in the colon to the level of the proximal-mid sigmoid consistent with constipation with possible small fecaloma. Mild bowel wall thickening and pericolonic inflammatory stranding noted in this location where there are   also diverticula. Findings most likely reflect mild uncomplicated diverticulitis and less likely, stercoral colitis.\"    Pt denies changes in diet, fever, vomiting, or acute pain. Pt noted abdominal pain in ED however prescribed lubiprostone provided relief. Pt reports trying OTC laxatives, mag citrate x1.5, 2 fleet enemas with no relief of symptoms. ED provider offered admission for suppository however pt preferred purchase over the counter to take in the comfort of her home. Pt states she has been nauseous but Zofran helps. She reports continued hydration and purchase of saline enema which she will try today. Pt reports being \"bloated and obnoxiously uncomfortable\" We discussed BRAT diet and importance of fiber along with hydration. Pt is passing gas. WE discussed alarming symptoms that would warrant ED evaluation. Pt requests further advise from provider.    Pt would appreciate a callback from office with advise once provider adds in.          Reason for Disposition   MILD constipation    Answer Assessment - Initial Assessment Questions  1. STOOL PATTERN OR FREQUENCY: \"How often do you pass bowel movements (BMs)?\"  (Normal range: tid to q 3 days)  \"When was the last BM passed?\"        01/05/24    2. STRAINING: \"Do you have to strain to have a BM?\"      \" Pushes to try and get it out\"    6. CHRONIC CONSTIPATION: \"Is this a new problem for you?\"  If no, ask: \"How long have you had this problem?\" " "(days, weeks, months)       Denies    7. CHANGES IN DIET OR HYDRATION: \"Have there been any recent changes in your diet?\" \"How much fluids are you drinking consuming on a daily basis?\"  \"How much have you had to drink today?\"      Remains hydrated    8. MEDICATIONS: \"Have you been taking any new medications?\" \"Are you taking any narcotic pain medications?\" (e.g., Vicoden, Percocet, morphine, dilaudid)      Subutex    9. LAXATIVES: \"Have you been using any stool softeners, laxatives, or enemas?\"  If yes, ask \"What, how often, and when was the last time?\"      10.ACTIVITY:  \"How much walking do you do every day? on a daily basis?\"  \"Has your activity level decreased in the past week?\"         Activity remains the same    11. CAUSE: \"What do you think is causing the constipation?\"         Unsure    12. OTHER SYMPTOMS: \"Do you have any other symptoms?\" (e.g., abdominal pain, bloating, fever, vomiting)        Denies    13. MEDICAL HISTORY: \"Do you have a history of hemorrhoids, rectal fissures, or rectal surgery or rectal abscess?\"          denies    Protocols used: Constipation-ADULT-[place]    "

## 2024-01-20 ENCOUNTER — HOSPITAL ENCOUNTER (INPATIENT)
Facility: HOSPITAL | Age: 49
LOS: 1 days | Discharge: HOME | DRG: 206 | End: 2024-01-21
Attending: EMERGENCY MEDICINE | Admitting: SURGERY
Payer: COMMERCIAL

## 2024-01-20 ENCOUNTER — APPOINTMENT (EMERGENCY)
Dept: CT IMAGING | Facility: HOSPITAL | Age: 49
DRG: 206 | End: 2024-01-20
Payer: COMMERCIAL

## 2024-01-20 DIAGNOSIS — S30.0XXA LUMBAR CONTUSION, INITIAL ENCOUNTER: ICD-10-CM

## 2024-01-20 DIAGNOSIS — S09.90XA TRAUMATIC INJURY OF HEAD, INITIAL ENCOUNTER: ICD-10-CM

## 2024-01-20 DIAGNOSIS — S32.009A LUMBAR TRANSVERSE PROCESS FRACTURE, CLOSED, INITIAL ENCOUNTER (HCC): Primary | ICD-10-CM

## 2024-01-20 DIAGNOSIS — I10 ESSENTIAL HYPERTENSION: ICD-10-CM

## 2024-01-20 DIAGNOSIS — M54.6 ACUTE RIGHT-SIDED THORACIC BACK PAIN: ICD-10-CM

## 2024-01-20 DIAGNOSIS — G89.29 CHRONIC LOW BACK PAIN, UNSPECIFIED BACK PAIN LATERALITY, UNSPECIFIED WHETHER SCIATICA PRESENT: ICD-10-CM

## 2024-01-20 DIAGNOSIS — M54.50 CHRONIC LOW BACK PAIN, UNSPECIFIED BACK PAIN LATERALITY, UNSPECIFIED WHETHER SCIATICA PRESENT: ICD-10-CM

## 2024-01-20 DIAGNOSIS — N39.0 UTI (URINARY TRACT INFECTION): ICD-10-CM

## 2024-01-20 DIAGNOSIS — D50.9 IRON DEFICIENCY ANEMIA, UNSPECIFIED IRON DEFICIENCY ANEMIA TYPE: ICD-10-CM

## 2024-01-20 DIAGNOSIS — S22.31XA CLOSED FRACTURE OF ONE RIB OF RIGHT SIDE, INITIAL ENCOUNTER: ICD-10-CM

## 2024-01-20 DIAGNOSIS — F11.20 CONTINUOUS OPIOID DEPENDENCE (HCC): ICD-10-CM

## 2024-01-20 LAB
ALBUMIN SERPL BCP-MCNC: 4 G/DL (ref 3.5–5)
ALP SERPL-CCNC: 54 U/L (ref 34–104)
ALT SERPL W P-5'-P-CCNC: 29 U/L (ref 7–52)
ANION GAP SERPL CALCULATED.3IONS-SCNC: 8 MMOL/L
AST SERPL W P-5'-P-CCNC: 32 U/L (ref 13–39)
BACTERIA UR QL AUTO: ABNORMAL /HPF
BASOPHILS # BLD AUTO: 0.03 THOUSANDS/ÂΜL (ref 0–0.1)
BASOPHILS NFR BLD AUTO: 1 % (ref 0–1)
BILIRUB SERPL-MCNC: 0.27 MG/DL (ref 0.2–1)
BILIRUB UR QL STRIP: NEGATIVE
BUN SERPL-MCNC: 11 MG/DL (ref 5–25)
CALCIUM SERPL-MCNC: 9.1 MG/DL (ref 8.4–10.2)
CHLORIDE SERPL-SCNC: 101 MMOL/L (ref 96–108)
CLARITY UR: ABNORMAL
CO2 SERPL-SCNC: 24 MMOL/L (ref 21–32)
COLOR UR: YELLOW
CREAT SERPL-MCNC: 0.59 MG/DL (ref 0.6–1.3)
EOSINOPHIL # BLD AUTO: 0.14 THOUSAND/ÂΜL (ref 0–0.61)
EOSINOPHIL NFR BLD AUTO: 2 % (ref 0–6)
ERYTHROCYTE [DISTWIDTH] IN BLOOD BY AUTOMATED COUNT: 20.5 % (ref 11.6–15.1)
GFR SERPL CREATININE-BSD FRML MDRD: 107 ML/MIN/1.73SQ M
GLUCOSE SERPL-MCNC: 135 MG/DL (ref 65–140)
GLUCOSE UR STRIP-MCNC: NEGATIVE MG/DL
HCT VFR BLD AUTO: 37 % (ref 34.8–46.1)
HGB BLD-MCNC: 10.6 G/DL (ref 11.5–15.4)
HGB UR QL STRIP.AUTO: ABNORMAL
IMM GRANULOCYTES # BLD AUTO: 0.01 THOUSAND/UL (ref 0–0.2)
IMM GRANULOCYTES NFR BLD AUTO: 0 % (ref 0–2)
KETONES UR STRIP-MCNC: NEGATIVE MG/DL
LEUKOCYTE ESTERASE UR QL STRIP: ABNORMAL
LYMPHOCYTES # BLD AUTO: 2.51 THOUSANDS/ÂΜL (ref 0.6–4.47)
LYMPHOCYTES NFR BLD AUTO: 43 % (ref 14–44)
MCH RBC QN AUTO: 21.6 PG (ref 26.8–34.3)
MCHC RBC AUTO-ENTMCNC: 28.6 G/DL (ref 31.4–37.4)
MCV RBC AUTO: 76 FL (ref 82–98)
MONOCYTES # BLD AUTO: 0.31 THOUSAND/ÂΜL (ref 0.17–1.22)
MONOCYTES NFR BLD AUTO: 5 % (ref 4–12)
NEUTROPHILS # BLD AUTO: 2.85 THOUSANDS/ÂΜL (ref 1.85–7.62)
NEUTS SEG NFR BLD AUTO: 49 % (ref 43–75)
NITRITE UR QL STRIP: NEGATIVE
NON-SQ EPI CELLS URNS QL MICRO: ABNORMAL /HPF
NRBC BLD AUTO-RTO: 0 /100 WBCS
PH UR STRIP.AUTO: 6 [PH]
PLATELET # BLD AUTO: 305 THOUSANDS/UL (ref 149–390)
PMV BLD AUTO: 9.2 FL (ref 8.9–12.7)
POTASSIUM SERPL-SCNC: 3.9 MMOL/L (ref 3.5–5.3)
PROT SERPL-MCNC: 7.3 G/DL (ref 6.4–8.4)
PROT UR STRIP-MCNC: ABNORMAL MG/DL
RBC # BLD AUTO: 4.9 MILLION/UL (ref 3.81–5.12)
RBC #/AREA URNS AUTO: ABNORMAL /HPF
SODIUM SERPL-SCNC: 133 MMOL/L (ref 135–147)
SP GR UR STRIP.AUTO: 1.04 (ref 1–1.03)
UROBILINOGEN UR STRIP-ACNC: <2 MG/DL
WBC # BLD AUTO: 5.85 THOUSAND/UL (ref 4.31–10.16)
WBC #/AREA URNS AUTO: ABNORMAL /HPF

## 2024-01-20 PROCEDURE — 74177 CT ABD & PELVIS W/CONTRAST: CPT

## 2024-01-20 PROCEDURE — 72125 CT NECK SPINE W/O DYE: CPT

## 2024-01-20 PROCEDURE — G1004 CDSM NDSC: HCPCS

## 2024-01-20 PROCEDURE — 96375 TX/PRO/DX INJ NEW DRUG ADDON: CPT

## 2024-01-20 PROCEDURE — 96374 THER/PROPH/DIAG INJ IV PUSH: CPT

## 2024-01-20 PROCEDURE — 81001 URINALYSIS AUTO W/SCOPE: CPT | Performed by: EMERGENCY MEDICINE

## 2024-01-20 PROCEDURE — 80053 COMPREHEN METABOLIC PANEL: CPT | Performed by: EMERGENCY MEDICINE

## 2024-01-20 PROCEDURE — 99284 EMERGENCY DEPT VISIT MOD MDM: CPT

## 2024-01-20 PROCEDURE — 36415 COLL VENOUS BLD VENIPUNCTURE: CPT | Performed by: EMERGENCY MEDICINE

## 2024-01-20 PROCEDURE — 70450 CT HEAD/BRAIN W/O DYE: CPT

## 2024-01-20 PROCEDURE — 71260 CT THORAX DX C+: CPT

## 2024-01-20 PROCEDURE — 87086 URINE CULTURE/COLONY COUNT: CPT | Performed by: EMERGENCY MEDICINE

## 2024-01-20 PROCEDURE — 85025 COMPLETE CBC W/AUTO DIFF WBC: CPT | Performed by: EMERGENCY MEDICINE

## 2024-01-20 RX ORDER — NITROFURANTOIN 25; 75 MG/1; MG/1
100 CAPSULE ORAL ONCE
Status: COMPLETED | OUTPATIENT
Start: 2024-01-21 | End: 2024-01-21

## 2024-01-20 RX ORDER — NITROFURANTOIN 25; 75 MG/1; MG/1
100 CAPSULE ORAL 2 TIMES DAILY
Qty: 9 CAPSULE | Refills: 0 | Status: SHIPPED | OUTPATIENT
Start: 2024-01-20 | End: 2024-01-21

## 2024-01-20 RX ORDER — HYDROMORPHONE HCL/PF 1 MG/ML
1 SYRINGE (ML) INJECTION ONCE
Status: COMPLETED | OUTPATIENT
Start: 2024-01-21 | End: 2024-01-21

## 2024-01-20 RX ORDER — ONDANSETRON 2 MG/ML
4 INJECTION INTRAMUSCULAR; INTRAVENOUS ONCE
Status: COMPLETED | OUTPATIENT
Start: 2024-01-20 | End: 2024-01-20

## 2024-01-20 RX ORDER — METHOCARBAMOL 500 MG/1
500 TABLET, FILM COATED ORAL ONCE
Status: COMPLETED | OUTPATIENT
Start: 2024-01-20 | End: 2024-01-20

## 2024-01-20 RX ORDER — LIDOCAINE 50 MG/G
1 PATCH TOPICAL ONCE
Status: COMPLETED | OUTPATIENT
Start: 2024-01-20 | End: 2024-01-21

## 2024-01-20 RX ORDER — ACETAMINOPHEN 325 MG/1
975 TABLET ORAL ONCE
Status: COMPLETED | OUTPATIENT
Start: 2024-01-21 | End: 2024-01-21

## 2024-01-20 RX ORDER — HYDROMORPHONE HCL/PF 1 MG/ML
1 SYRINGE (ML) INJECTION ONCE
Status: COMPLETED | OUTPATIENT
Start: 2024-01-20 | End: 2024-01-20

## 2024-01-20 RX ADMIN — HYDROMORPHONE HYDROCHLORIDE 1 MG: 1 INJECTION, SOLUTION INTRAMUSCULAR; INTRAVENOUS; SUBCUTANEOUS at 21:36

## 2024-01-20 RX ADMIN — LIDOCAINE 1 PATCH: 700 PATCH TOPICAL at 21:37

## 2024-01-20 RX ADMIN — IOHEXOL 100 ML: 350 INJECTION, SOLUTION INTRAVENOUS at 22:16

## 2024-01-20 RX ADMIN — ONDANSETRON 4 MG: 2 INJECTION INTRAMUSCULAR; INTRAVENOUS at 21:36

## 2024-01-20 RX ADMIN — METHOCARBAMOL TABLETS 500 MG: 500 TABLET, COATED ORAL at 21:37

## 2024-01-20 NOTE — Clinical Note
Mckenna Felton was seen and treated in our emergency department on 1/20/2024.                Diagnosis:     Mckenna  .    She may return on this date:     May return 1/22-27, as improved     If you have any questions or concerns, please don't hesitate to call.      Kassidy Davis MD    ______________________________           _______________          _______________  Hospital Representative                              Date                                Time

## 2024-01-21 VITALS
HEART RATE: 79 BPM | WEIGHT: 215.83 LBS | DIASTOLIC BLOOD PRESSURE: 75 MMHG | SYSTOLIC BLOOD PRESSURE: 125 MMHG | RESPIRATION RATE: 17 BRPM | HEIGHT: 65 IN | TEMPERATURE: 99.3 F | OXYGEN SATURATION: 91 % | BODY MASS INDEX: 35.96 KG/M2

## 2024-01-21 PROBLEM — S32.009A CLOSED FRACTURE OF TRANSVERSE PROCESS OF LUMBAR VERTEBRA (HCC): Status: ACTIVE | Noted: 2024-01-21

## 2024-01-21 PROBLEM — S22.31XA CLOSED FRACTURE OF ONE RIB OF RIGHT SIDE: Status: ACTIVE | Noted: 2024-01-21

## 2024-01-21 PROBLEM — G89.11 ACUTE PAIN DUE TO TRAUMA: Status: ACTIVE | Noted: 2024-01-21
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
## 2024-01-21 LAB
GLUCOSE SERPL-MCNC: 148 MG/DL (ref 65–140)
GLUCOSE SERPL-MCNC: 152 MG/DL (ref 65–140)
GLUCOSE SERPL-MCNC: 153 MG/DL (ref 65–140)

## 2024-01-21 PROCEDURE — 96376 TX/PRO/DX INJ SAME DRUG ADON: CPT

## 2024-01-21 PROCEDURE — NC001 PR NO CHARGE

## 2024-01-21 PROCEDURE — 99285 EMERGENCY DEPT VISIT HI MDM: CPT | Performed by: EMERGENCY MEDICINE

## 2024-01-21 PROCEDURE — 99223 1ST HOSP IP/OBS HIGH 75: CPT | Performed by: ANESTHESIOLOGY

## 2024-01-21 PROCEDURE — 99235 HOSP IP/OBS SAME DATE MOD 70: CPT | Performed by: SURGERY

## 2024-01-21 PROCEDURE — 82948 REAGENT STRIP/BLOOD GLUCOSE: CPT

## 2024-01-21 PROCEDURE — NC001 PR NO CHARGE: Performed by: NURSE PRACTITIONER

## 2024-01-21 RX ORDER — DOCUSATE SODIUM 100 MG/1
100 CAPSULE, LIQUID FILLED ORAL 2 TIMES DAILY
Status: DISCONTINUED | OUTPATIENT
Start: 2024-01-21 | End: 2024-01-21 | Stop reason: HOSPADM

## 2024-01-21 RX ORDER — INSULIN LISPRO 100 [IU]/ML
1-6 INJECTION, SOLUTION INTRAVENOUS; SUBCUTANEOUS
Status: DISCONTINUED | OUTPATIENT
Start: 2024-01-21 | End: 2024-01-21 | Stop reason: HOSPADM

## 2024-01-21 RX ORDER — OXYCODONE HYDROCHLORIDE 10 MG/1
10 TABLET ORAL EVERY 4 HOURS PRN
Status: DISCONTINUED | OUTPATIENT
Start: 2024-01-21 | End: 2024-01-21

## 2024-01-21 RX ORDER — NITROFURANTOIN 25; 75 MG/1; MG/1
100 CAPSULE ORAL 2 TIMES DAILY WITH MEALS
Status: DISCONTINUED | OUTPATIENT
Start: 2024-01-21 | End: 2024-01-21 | Stop reason: HOSPADM

## 2024-01-21 RX ORDER — SENNOSIDES 8.6 MG
2 TABLET ORAL DAILY
Status: DISCONTINUED | OUTPATIENT
Start: 2024-01-21 | End: 2024-01-21 | Stop reason: HOSPADM

## 2024-01-21 RX ORDER — METHOCARBAMOL 750 MG/1
750 TABLET, FILM COATED ORAL EVERY 6 HOURS SCHEDULED
Status: DISCONTINUED | OUTPATIENT
Start: 2024-01-21 | End: 2024-01-21 | Stop reason: HOSPADM

## 2024-01-21 RX ORDER — HYDROMORPHONE HYDROCHLORIDE 2 MG/1
6 TABLET ORAL EVERY 4 HOURS PRN
Status: DISCONTINUED | OUTPATIENT
Start: 2024-01-21 | End: 2024-01-21 | Stop reason: HOSPADM

## 2024-01-21 RX ORDER — BUPRENORPHINE 2 MG/1
8 TABLET SUBLINGUAL 2 TIMES DAILY
Status: DISCONTINUED | OUTPATIENT
Start: 2024-01-21 | End: 2024-01-21 | Stop reason: HOSPADM

## 2024-01-21 RX ORDER — HALOPERIDOL 5 MG/ML
2 INJECTION INTRAMUSCULAR
Status: DISCONTINUED | OUTPATIENT
Start: 2024-01-21 | End: 2024-01-21

## 2024-01-21 RX ORDER — ACETAMINOPHEN 325 MG/1
975 TABLET ORAL EVERY 8 HOURS SCHEDULED
Qty: 60 TABLET | Refills: 0 | Status: SHIPPED | OUTPATIENT
Start: 2024-01-21

## 2024-01-21 RX ORDER — HYDROMORPHONE HYDROCHLORIDE 4 MG/1
4 TABLET ORAL EVERY 4 HOURS PRN
Qty: 20 TABLET | Refills: 0 | Status: SHIPPED | OUTPATIENT
Start: 2024-01-21 | End: 2024-01-26 | Stop reason: SDUPTHER

## 2024-01-21 RX ORDER — ONDANSETRON 2 MG/ML
4 INJECTION INTRAMUSCULAR; INTRAVENOUS EVERY 8 HOURS PRN
Status: DISCONTINUED | OUTPATIENT
Start: 2024-01-21 | End: 2024-01-21 | Stop reason: HOSPADM

## 2024-01-21 RX ORDER — ACETAMINOPHEN 325 MG/1
975 TABLET ORAL EVERY 8 HOURS SCHEDULED
Status: DISCONTINUED | OUTPATIENT
Start: 2024-01-21 | End: 2024-01-21 | Stop reason: HOSPADM

## 2024-01-21 RX ORDER — POLYETHYLENE GLYCOL 3350 17 G/17G
17 POWDER, FOR SOLUTION ORAL DAILY
Status: DISCONTINUED | OUTPATIENT
Start: 2024-01-21 | End: 2024-01-21 | Stop reason: HOSPADM

## 2024-01-21 RX ORDER — HYDROMORPHONE HCL/PF 1 MG/ML
1 SYRINGE (ML) INJECTION ONCE
Status: COMPLETED | OUTPATIENT
Start: 2024-01-21 | End: 2024-01-21

## 2024-01-21 RX ORDER — NITROFURANTOIN 25; 75 MG/1; MG/1
100 CAPSULE ORAL 2 TIMES DAILY WITH MEALS
Qty: 14 CAPSULE | Refills: 0 | Status: SHIPPED | OUTPATIENT
Start: 2024-01-22 | End: 2024-01-30

## 2024-01-21 RX ORDER — BUPRENORPHINE 8 MG/1
8 TABLET SUBLINGUAL 3 TIMES DAILY PRN
Status: DISCONTINUED | OUTPATIENT
Start: 2024-01-21 | End: 2024-01-21

## 2024-01-21 RX ORDER — LISINOPRIL 20 MG/1
20 TABLET ORAL 2 TIMES DAILY
Status: DISCONTINUED | OUTPATIENT
Start: 2024-01-21 | End: 2024-01-21 | Stop reason: HOSPADM

## 2024-01-21 RX ORDER — METHOCARBAMOL 500 MG/1
500 TABLET, FILM COATED ORAL 4 TIMES DAILY PRN
Qty: 20 TABLET | Refills: 0 | Status: SHIPPED | OUTPATIENT
Start: 2024-01-21 | End: 2024-01-21

## 2024-01-21 RX ORDER — HYDROMORPHONE HCL/PF 1 MG/ML
0.5 SYRINGE (ML) INJECTION EVERY 4 HOURS PRN
Status: DISCONTINUED | OUTPATIENT
Start: 2024-01-21 | End: 2024-01-21 | Stop reason: HOSPADM

## 2024-01-21 RX ORDER — BUPRENORPHINE 2 MG/1
8 TABLET SUBLINGUAL 3 TIMES DAILY
Status: DISCONTINUED | OUTPATIENT
Start: 2024-01-21 | End: 2024-01-21

## 2024-01-21 RX ORDER — ENOXAPARIN SODIUM 100 MG/ML
30 INJECTION SUBCUTANEOUS EVERY 12 HOURS
Status: DISCONTINUED | OUTPATIENT
Start: 2024-01-21 | End: 2024-01-21 | Stop reason: HOSPADM

## 2024-01-21 RX ORDER — AMLODIPINE BESYLATE 5 MG/1
5 TABLET ORAL 2 TIMES DAILY
Status: DISCONTINUED | OUTPATIENT
Start: 2024-01-21 | End: 2024-01-21 | Stop reason: HOSPADM

## 2024-01-21 RX ORDER — HYDROMORPHONE HCL/PF 1 MG/ML
0.5 SYRINGE (ML) INJECTION EVERY 2 HOUR PRN
Status: DISCONTINUED | OUTPATIENT
Start: 2024-01-21 | End: 2024-01-21

## 2024-01-21 RX ORDER — METHOCARBAMOL 750 MG/1
750 TABLET, FILM COATED ORAL EVERY 6 HOURS SCHEDULED
Qty: 40 TABLET | Refills: 0 | Status: SHIPPED | OUTPATIENT
Start: 2024-01-21

## 2024-01-21 RX ORDER — LORAZEPAM 2 MG/ML
1 INJECTION INTRAMUSCULAR
Status: DISCONTINUED | OUTPATIENT
Start: 2024-01-21 | End: 2024-01-21

## 2024-01-21 RX ORDER — GLYCOPYRROLATE 0.2 MG/ML
0.2 INJECTION INTRAMUSCULAR; INTRAVENOUS EVERY 4 HOURS PRN
Status: DISCONTINUED | OUTPATIENT
Start: 2024-01-21 | End: 2024-01-21

## 2024-01-21 RX ORDER — HYDROMORPHONE HYDROCHLORIDE 2 MG/1
4 TABLET ORAL EVERY 4 HOURS PRN
Status: DISCONTINUED | OUTPATIENT
Start: 2024-01-21 | End: 2024-01-21 | Stop reason: HOSPADM

## 2024-01-21 RX ORDER — OXYCODONE HYDROCHLORIDE 5 MG/1
5 TABLET ORAL EVERY 4 HOURS PRN
Status: DISCONTINUED | OUTPATIENT
Start: 2024-01-21 | End: 2024-01-21

## 2024-01-21 RX ADMIN — HYDROMORPHONE HYDROCHLORIDE 0.5 MG: 1 INJECTION, SOLUTION INTRAMUSCULAR; INTRAVENOUS; SUBCUTANEOUS at 18:07

## 2024-01-21 RX ADMIN — AMLODIPINE BESYLATE 5 MG: 5 TABLET ORAL at 08:36

## 2024-01-21 RX ADMIN — ACETAMINOPHEN 975 MG: 325 TABLET ORAL at 00:03

## 2024-01-21 RX ADMIN — SENNOSIDES 17.2 MG: 8.6 TABLET, FILM COATED ORAL at 08:36

## 2024-01-21 RX ADMIN — METHOCARBAMOL TABLETS 750 MG: 750 TABLET, COATED ORAL at 18:06

## 2024-01-21 RX ADMIN — HYDROMORPHONE HYDROCHLORIDE 6 MG: 2 TABLET ORAL at 14:41

## 2024-01-21 RX ADMIN — INSULIN LISPRO 1 UNITS: 100 INJECTION, SOLUTION INTRAVENOUS; SUBCUTANEOUS at 11:25

## 2024-01-21 RX ADMIN — OXYCODONE HYDROCHLORIDE 10 MG: 10 TABLET ORAL at 02:30

## 2024-01-21 RX ADMIN — ONDANSETRON 4 MG: 2 INJECTION INTRAMUSCULAR; INTRAVENOUS at 01:58

## 2024-01-21 RX ADMIN — DOCUSATE SODIUM 100 MG: 100 CAPSULE, LIQUID FILLED ORAL at 08:36

## 2024-01-21 RX ADMIN — HYDROMORPHONE HYDROCHLORIDE 0.5 MG: 1 INJECTION, SOLUTION INTRAMUSCULAR; INTRAVENOUS; SUBCUTANEOUS at 05:30

## 2024-01-21 RX ADMIN — HYDROMORPHONE HYDROCHLORIDE 1 MG: 1 INJECTION, SOLUTION INTRAMUSCULAR; INTRAVENOUS; SUBCUTANEOUS at 02:30

## 2024-01-21 RX ADMIN — NITROFURANTOIN (MONOHYDRATE/MACROCRYSTALS) 100 MG: 75; 25 CAPSULE ORAL at 16:24

## 2024-01-21 RX ADMIN — HYDROMORPHONE HYDROCHLORIDE 1 MG: 1 INJECTION, SOLUTION INTRAMUSCULAR; INTRAVENOUS; SUBCUTANEOUS at 00:03

## 2024-01-21 RX ADMIN — BUPRENORPHINE HCL 8 MG: 2 TABLET SUBLINGUAL at 10:16

## 2024-01-21 RX ADMIN — METHOCARBAMOL TABLETS 750 MG: 750 TABLET, COATED ORAL at 11:25

## 2024-01-21 RX ADMIN — LISINOPRIL 20 MG: 20 TABLET ORAL at 08:36

## 2024-01-21 RX ADMIN — OXYCODONE HYDROCHLORIDE 10 MG: 10 TABLET ORAL at 11:28

## 2024-01-21 RX ADMIN — NITROFURANTOIN (MONOHYDRATE/MACROCRYSTALS) 100 MG: 75; 25 CAPSULE ORAL at 08:36

## 2024-01-21 RX ADMIN — METHOCARBAMOL TABLETS 750 MG: 750 TABLET, COATED ORAL at 01:18

## 2024-01-21 RX ADMIN — NITROFURANTOIN (MONOHYDRATE/MACROCRYSTALS) 100 MG: 75; 25 CAPSULE ORAL at 00:03

## 2024-01-21 RX ADMIN — HYDROMORPHONE HYDROCHLORIDE 0.5 MG: 1 INJECTION, SOLUTION INTRAMUSCULAR; INTRAVENOUS; SUBCUTANEOUS at 08:34

## 2024-01-21 RX ADMIN — ONDANSETRON 4 MG: 2 INJECTION INTRAMUSCULAR; INTRAVENOUS at 10:19

## 2024-01-21 RX ADMIN — METHOCARBAMOL TABLETS 750 MG: 750 TABLET, COATED ORAL at 05:30

## 2024-01-21 RX ADMIN — ACETAMINOPHEN 975 MG: 325 TABLET, FILM COATED ORAL at 05:30

## 2024-01-21 RX ADMIN — POLYETHYLENE GLYCOL 3350 17 G: 17 POWDER, FOR SOLUTION ORAL at 08:36

## 2024-01-21 NOTE — ED PROVIDER NOTES
Patient is a 48-year-old female history  Chief Complaint   Patient presents with    Fall     Patient reports slipping and falling on wet stairs. States she fell down 4 wooden steps hitting back and then head. Reports pain in mid-lower back. Reporting severe pain in R flank, describes as burning. Denies thinners. Rates pain 10/10.      Patient is a 48-year-old female who presents to the emergency department for evaluation of injuries sustained in fall last night.  She slipped down 4 steps and landed on her back.  She struck the back of her head on each of the few steps and additionally her right side.  She has repeatedly applied ice to the upper and lower back as well as her head and taken acetaminophen and ibuprofen with only mild relief in pain.  She has an intense pressure sensation in the right flank which worsens with movement.  No hematuria, dyspnea or hemoptysis.  Past medical history significant for hypertension, diabetes and chronic pain disorder.  She was diagnosed with nonspecific connective tissue disorder and is currently undergoing evaluation for a more specific diagnosis through her current pain specialist.    Last dose of acetaminophen 1000 mg and ibuprofen 800 mg was at 1730.        Prior to Admission Medications   Prescriptions Last Dose Informant Patient Reported? Taking?   [facility] COVID-19 Rapid Test KIT  Self Yes No   amLODIPine (NORVASC) 5 mg tablet  Self Yes No   Sig: Take 5 mg by mouth 2 (two) times a day As directed   buprenorphine (SUBUTEX) 8 mg  Self Yes No   Sig: TAKE 3-3.5 TABLETS UNDER THE TONGUE EVERY DAY AS NEEDED FOR 30 DAYS. DO NOT COMBINE WITH OTHER OPIOIDS. CAUTION WITH OTHER PSYCHOTROPIC DRUG   buprenorphine-naloxone (SUBOXONE) 8-2 mg per SL tablet  Self Yes No   Sig: Place 1 tablet under the tongue 2 (two) times a day   ciprofloxacin (CIPRO) 500 mg tablet   No No   Sig: Take 1 tablet (500 mg total) by mouth 2 (two) times a day for 10 days   ergocalciferol (VITAMIN D2) 50,000  units  Self Yes No   Sig: Take 50,000 Units by mouth once a week   lisinopril (ZESTRIL) 20 mg tablet  Self Yes No   Sig: Take 20 mg by mouth 2 (two) times a day   magnesium citrate (CITROMA) 1.745 g/30 mL oral solution   No No   Sig: Take 296 mL by mouth once for 1 dose   metroNIDAZOLE (FLAGYL) 500 mg tablet   No No   Sig: Take 1 tablet (500 mg total) by mouth every 8 (eight) hours for 10 days   ondansetron (ZOFRAN) 4 mg tablet  Self Yes No   Sig: TAKE 1 TO 2 TABLETS BY MOUTH EVERY 12 HOURS AS NEEDED FOR NAUSEA, TAKEN 30 MINUTES BEFORE BUPRENORPHINE DOSING FOR 15 DAYS, USE LOWEST EFFEC   polyethylene glycol (GOLYTELY) 4000 mL solution   No No   Sig: Take 4,000 mL by mouth once for 1 dose   sitaGLIPtin-metFORMIN (JANUMET)  MG per tablet  Self Yes No   Sig: Take 1 tablet by mouth daily   traMADol (ULTRAM) 50 mg tablet  Self Yes No   Sig: Take 50 mg by mouth every 6 (six) hours as needed for moderate pain      Facility-Administered Medications: None       Past Medical History:   Diagnosis Date    Connective tissue disorder (HCC)     non-specific    Degenerative disc disease, lumbar     Diabetes mellitus (HCC)     Gastritis     Hypertension        Past Surgical History:   Procedure Laterality Date     SECTION       SECTION         History reviewed. No pertinent family history.  I have reviewed and agree with the history as documented.    E-Cigarette/Vaping    E-Cigarette Use Never User      E-Cigarette/Vaping Substances    Nicotine No     THC No     CBD No     Flavoring No     Other No     Unknown No      Social History     Tobacco Use    Smoking status: Former     Types: Cigarettes    Smokeless tobacco: Never   Vaping Use    Vaping status: Never Used   Substance Use Topics    Alcohol use: Not Currently    Drug use: No       Review of Systems   Constitutional:  Negative for fever.   Eyes:  Positive for photophobia. Negative for visual disturbance.   Gastrointestinal:  Positive for nausea. Negative  for abdominal pain, blood in stool and vomiting.   Genitourinary:  Positive for flank pain and frequency. Negative for dysuria.   Musculoskeletal:  Positive for back pain.   Skin:  Negative for rash.   Neurological:  Positive for headaches. Negative for weakness.   All other systems reviewed and are negative.      Physical Exam  Physical Exam  Vitals and nursing note reviewed.   Constitutional:       General: She is in acute distress.      Comments: Uncomfortable appearing curled on left side.  Cautious and clearly distressed with position changes.   HENT:      Head:     Cardiovascular:      Rate and Rhythm: Normal rate and regular rhythm.      Heart sounds: Normal heart sounds.   Pulmonary:      Effort: Pulmonary effort is normal.      Breath sounds: Normal breath sounds.   Abdominal:      General: Bowel sounds are normal.      Palpations: Abdomen is soft.      Tenderness: There is abdominal tenderness (Right upper quadrant). There is right CVA tenderness. There is no left CVA tenderness.   Musculoskeletal:         General: Normal range of motion.      Cervical back: Normal range of motion and neck supple.      Comments: Midline tenderness is appreciated in the low thoracic or lumbar region.  Tender and additionally appreciated over the right lower thoracic region entire right lumbar region.  Mild soft tissue swelling and early patchy purple ecchymosis appreciated in the right lower lumbar region.  No open skin.   Skin:     General: Skin is warm and dry.      Findings: No rash.   Neurological:      General: No focal deficit present.      Mental Status: She is alert and oriented to person, place, and time.      Comments: Clear speech. No facial asymmetry/ deficit appreciated.  Pupils briskly reactive to light.  EOMI.  No nystagmus. Strong eye closure & symmetric brow raise. Ability to insufflate cheeks, protrude tongue midline & range this laterally. Symmetric/ intact sensation in the upper, mid & lower portions of  the face.  5/5 strength on head turn, shoulder shrug, bicep, tricep & deltoid movement against resistance b/l.  5/5 strength on b/l hand , pincer grasp, finger abduction, dorsi & volar flexion, hip flexion, dorsi & plantar flexion. Symmetric grossly intact sensation in the UE & LE.  Steady gait.  No dysmetria.     Psychiatric:         Mood and Affect: Mood normal.         Behavior: Behavior normal.         Vital Signs  ED Triage Vitals [01/20/24 2055]   Temperature Pulse Respirations Blood Pressure SpO2   97.7 °F (36.5 °C) 74 20 150/72 97 %      Temp Source Heart Rate Source Patient Position - Orthostatic VS BP Location FiO2 (%)   Oral Monitor Sitting Right arm --      Pain Score       10 - Worst Possible Pain           Vitals:    01/20/24 2055   BP: 150/72   Pulse: 74   Patient Position - Orthostatic VS: Sitting         Visual Acuity      ED Medications  Medications   lidocaine (LIDODERM) 5 % patch 1 patch (1 patch Topical Medication Applied 1/20/24 2137)   docusate sodium (COLACE) capsule 100 mg (has no administration in time range)   senna (SENOKOT) tablet 17.2 mg (has no administration in time range)   polyethylene glycol (MIRALAX) packet 17 g (has no administration in time range)   ondansetron (ZOFRAN) injection 4 mg (has no administration in time range)   enoxaparin (LOVENOX) subcutaneous injection 30 mg (30 mg Subcutaneous Not Given 1/21/24 0122)   acetaminophen (TYLENOL) tablet 975 mg (975 mg Oral Not Given 1/21/24 0122)   oxyCODONE (ROXICODONE) IR tablet 5 mg (has no administration in time range)     Or   oxyCODONE (ROXICODONE) immediate release tablet 10 mg (has no administration in time range)   HYDROmorphone (DILAUDID) injection 0.5 mg (has no administration in time range)   naloxone (NARCAN) 0.04 mg/mL syringe 0.04 mg (has no administration in time range)   methocarbamol (ROBAXIN) tablet 750 mg (750 mg Oral Given 1/21/24 0118)   nitrofurantoin (MACROBID) extended-release capsule 100 mg (has no  administration in time range)   buprenorphine (SUBUTEX) 8 mg SL tablet 8 mg (has no administration in time range)   amLODIPine (NORVASC) tablet 5 mg (has no administration in time range)   lisinopril (ZESTRIL) tablet 20 mg (has no administration in time range)   insulin lispro (HumaLOG) 100 units/mL subcutaneous injection 1-6 Units (has no administration in time range)   ondansetron (ZOFRAN) injection 4 mg (4 mg Intravenous Given 1/20/24 2136)   HYDROmorphone (DILAUDID) injection 1 mg (1 mg Intravenous Given 1/20/24 2136)   methocarbamol (ROBAXIN) tablet 500 mg (500 mg Oral Given 1/20/24 2137)   iohexol (OMNIPAQUE) 350 MG/ML injection (MULTI-DOSE) 100 mL (100 mL Intravenous Given 1/20/24 2216)   acetaminophen (TYLENOL) tablet 975 mg (975 mg Oral Given 1/21/24 0003)   HYDROmorphone (DILAUDID) injection 1 mg (1 mg Intravenous Given 1/21/24 0003)   nitrofurantoin (MACROBID) extended-release capsule 100 mg (100 mg Oral Given 1/21/24 0003)       Diagnostic Studies  Results Reviewed       Procedure Component Value Units Date/Time    Urine culture [[id number]] Collected: 01/20/24 2140    Lab Status: In process Specimen: Urine, Clean Catch Updated: 01/21/24 0135    Comprehensive metabolic panel [[id number]]  (Abnormal) Collected: 01/20/24 2140    Lab Status: Final result Specimen: Blood from Arm, Right Updated: 01/20/24 2207     Sodium 133 mmol/L      Potassium 3.9 mmol/L      Chloride 101 mmol/L      CO2 24 mmol/L      ANION GAP 8 mmol/L      BUN 11 mg/dL      Creatinine 0.59 mg/dL      Glucose 135 mg/dL      Calcium 9.1 mg/dL      AST 32 U/L      ALT 29 U/L      Alkaline Phosphatase 54 U/L      Total Protein 7.3 g/dL      Albumin 4.0 g/dL      Total Bilirubin 0.27 mg/dL      eGFR 107 ml/min/1.73sq m     Narrative:      [employer] guidelines for Chronic Kidney Disease (CKD):     Stage 1 with normal or high GFR (GFR > 90 mL/min/1.73 square meters)    Stage 2 Mild CKD (GFR = 60-89 mL/min/1.73 square  meters)    Stage 3A Moderate CKD (GFR = 45-59 mL/min/1.73 square meters)    Stage 3B Moderate CKD (GFR = 30-44 mL/min/1.73 square meters)    Stage 4 Severe CKD (GFR = 15-29 mL/min/1.73 square meters)    Stage 5 End Stage CKD (GFR <15 mL/min/1.73 square meters)  Note: GFR calculation is accurate only with a steady state creatinine    Urine Microscopic [[id number]]  (Abnormal) Collected: 01/20/24 2140    Lab Status: Final result Specimen: Urine, Clean Catch Updated: 01/20/24 2205     RBC, UA 1-2 /hpf      WBC, UA 4-10 /hpf      Epithelial Cells Occasional /hpf      Bacteria, UA Occasional /hpf     UA (URINE) with reflex to Scope [[id number]]  (Abnormal) Collected: 01/20/24 2140    Lab Status: Final result Specimen: Urine, Clean Catch Updated: 01/20/24 2200     Color, UA Yellow     Clarity, UA Turbid     Specific Gravity, UA 1.045     pH, UA 6.0     Leukocytes, UA Large     Nitrite, UA Negative     Protein, UA 50 (1+) mg/dl      Glucose, UA Negative mg/dl      Ketones, UA Negative mg/dl      Urobilinogen, UA <2.0 mg/dl      Bilirubin, UA Negative     Occult Blood, UA Small    CBC and differential [[id number]]  (Abnormal) Collected: 01/20/24 2140    Lab Status: Final result Specimen: Blood from Arm, Right Updated: 01/20/24 2152     WBC 5.85 Thousand/uL      RBC 4.90 Million/uL      Hemoglobin 10.6 g/dL      Hematocrit 37.0 %      MCV 76 fL      MCH 21.6 pg      MCHC 28.6 g/dL      RDW 20.5 %      MPV 9.2 fL      Platelets 305 Thousands/uL      nRBC 0 /100 WBCs      Neutrophils Relative 49 %      Immat GRANS % 0 %      Lymphocytes Relative 43 %      Monocytes Relative 5 %      Eosinophils Relative 2 %      Basophils Relative 1 %      Neutrophils Absolute 2.85 Thousands/µL      Immature Grans Absolute 0.01 Thousand/uL      Lymphocytes Absolute 2.51 Thousands/µL      Monocytes Absolute 0.31 Thousand/µL      Eosinophils Absolute 0.14 Thousand/µL      Basophils Absolute 0.03 Thousands/µL                    CT head without  contrast   Final Result by Lisa Mir MD (01/21 0016)      No acute intracranial abnormality.                  Workstation performed: [id number]         CT cervical spine without contrast   Final Result by Lisa Mir MD (01/21 0015)      No cervical spine fracture or traumatic malalignment.                  Workstation performed: [id number]         CT chest abdomen pelvis w contrast   Final Result by Lisa Mir MD (01/21 0017)      Acute mildly displaced fracture of right 10th posterolateral rib.      Acute nondisplaced fractures of L1 and L2 right transverse processes.      Hepatomegaly with fatty infiltration.      The study was marked in EPIC for immediate notification.         Workstation performed: [id number]                    Procedures  Procedures         ED Course  DDX includes but is not limited to:  Concussion, posttraumatic headache, ICH, cervical strain, cervical fx, rib fx, pulmonary contusion, pneumothorax, hemothorax, vertebral fracture, retroperitoneal hemorrhage, renal injury, liver injury, less likely bowel injury, contusion, radiculopathy, strain    ED Course as of 01/21/24 0142   Sat Jan 20, 2024   2227 Labs have returned.  UA reveals presence of large leukocytes.  Microscopy: Occasional bacteria present along with increased white blood cells and very few epithelial cells. Suggestive of UTI.  CBC reveals mild anemia.  Hemoglobin is slightly above that on other recent checks suggesting away from active bleeding.   2348 Radiologist reviewing imaging studies.  Patient relays that pain had calmed down significantly and is now intensifying.  She notices this maximally with movement.  She is due for additional dose of acetaminophen.  She accepts additional dose of hydromorphone.     Discussed urine findings with her.  She has appreciated urinary frequency.  Will administer nitrofurantoin and prescribe short course of same.    We reviewed current headache and potential that this  represents concussion.  I have advised resting the next couple of days and following up with PT if headache continues.    She does follow with a pain specialist for her back though is interested in referral to the comprehensive spine program for current discomfort.       Sun Jan 21, 2024   0033 She has had only slight relief from additional medications.  Declines additional opioid at this time.  Reviewed CT results including presence of minimally displaced right 10th rib fracture and transverse fractures of L1 and L2.  She additionally notes that she is having more discomfort over the lower posterior sacral region laterally.  This is a burning sensation with movement-located at the sciatic notch.    Care was discussed with Dr. Gandhi and patient accepted to Dr. Faria's trauma service.                         SBIRT 22yo+      Flowsheet Row Most Recent Value   Initial Alcohol Screen: US AUDIT-C     1. How often do you have a drink containing alcohol? 0 Filed at: 01/20/2024 2055   2. How many drinks containing alcohol do you have on a typical day you are drinking?  0 Filed at: 01/20/2024 2055   3a. Male UNDER 65: How often do you have five or more drinks on one occasion? 0 Filed at: 01/20/2024 2055   3b. FEMALE Any Age, or MALE 65+: How often do you have 4 or more drinks on one occassion? 0 Filed at: 01/20/2024 2055   Audit-C Score 0 Filed at: 01/20/2024 2055   JALEEL: How many times in the past year have you...    Used an illegal drug or used a prescription medication for non-medical reasons? Never Filed at: 01/20/2024 2055                      Medical Decision Making  Amount and/or Complexity of Data Reviewed  Labs: ordered.  Radiology: ordered.    Risk  OTC drugs.  Prescription drug management.  Decision regarding hospitalization.             Disposition  Final diagnoses:   Traumatic injury of head, initial encounter   Lumbar contusion, initial encounter   Acute right-sided thoracic back pain   UTI (urinary tract  infection)   Closed fracture of one rib of right side, initial encounter   Lumbar transverse process fracture, closed, initial encounter (HCC)   Continuous opioid dependence (HCC)   Iron deficiency anemia, unspecified iron deficiency anemia type   Essential hypertension   Chronic low back pain, unspecified back pain laterality, unspecified whether sciatica present     Time reflects when diagnosis was documented in both MDM as applicable and the Disposition within this note       Time User Action Codes Description Comment    1/20/2024 11:51 PM [place], Kassidy A Add [S09.90XA] Traumatic injury of head, initial encounter     1/20/2024 11:52 PM [place], Kassidy A Add [S30.0XXA] Lumbar contusion, initial encounter     1/20/2024 11:52 PM Zarefes[place], Kassidy A Add [M54.6] Acute right-sided thoracic back pain     1/20/2024 11:55 PM [place], Kassidy A Add [N39.0] UTI (urinary tract infection)     1/21/2024 12:32 AM [place] Kassidy A Add [S22.31XA] Closed fracture of one rib of right side, initial encounter     1/21/2024 12:32 AM [place] Kassidy A Add [S32.009A] Lumbar transverse process fracture, closed, initial encounter (HCC)     1/21/2024 12:32 AM [place] Kassidy A Modify [S09.90XA] Traumatic injury of head, initial encounter     1/21/2024 12:32 AM [place] Kassidy A Modify [S32.009A] Lumbar transverse process fracture, closed, initial encounter (HCC)     1/21/2024 12:52 AM Myrtle Gandhi Add [F11.20] Continuous opioid dependence (HCC)     1/21/2024 12:55 AM Myrtle Gandhi Add [D50.9] Iron deficiency anemia, unspecified iron deficiency anemia type     1/21/2024 12:55 AM Myrtle Gandhi Add [I10] Essential hypertension     1/21/2024 12:55 AM Myrtle Gandhi Add [M54.50,  G89.29] Chronic low back pain, unspecified back pain laterality, unspecified whether sciatica present           ED Disposition       ED Disposition   Admit    Condition    Stable    Date/Time   Sun Jan 21, 2024 0033    Comment   Case was discussed with Dr. Gandhi and the patient's admission status was agreed to be Admission Status: inpatient status to the service of Dr. Faria .               Follow-up Information    None         Current Discharge Medication List        CONTINUE these medications which have NOT CHANGED    Details   amLODIPine (NORVASC) 5 mg tablet Take 5 mg by mouth 2 (two) times a day As directed      buprenorphine (SUBUTEX) 8 mg TAKE 3-3.5 TABLETS UNDER THE TONGUE EVERY DAY AS NEEDED FOR 30 DAYS. DO NOT COMBINE WITH OTHER OPIOIDS. CAUTION WITH OTHER PSYCHOTROPIC DRUG      buprenorphine-naloxone (SUBOXONE) 8-2 mg per SL tablet Place 1 tablet under the tongue 2 (two) times a day      ergocalciferol (VITAMIN D2) 50,000 units Take 50,000 Units by mouth once a week      [facility] COVID-19 Rapid Test KIT       lisinopril (ZESTRIL) 20 mg tablet Take 20 mg by mouth 2 (two) times a day      magnesium citrate (CITROMA) 1.745 g/30 mL oral solution Take 296 mL by mouth once for 1 dose  Qty: 296 mL, Refills: 0    Associated Diagnoses: Constipation      ondansetron (ZOFRAN) 4 mg tablet TAKE 1 TO 2 TABLETS BY MOUTH EVERY 12 HOURS AS NEEDED FOR NAUSEA, TAKEN 30 MINUTES BEFORE BUPRENORPHINE DOSING FOR 15 DAYS, USE LOWEST EFFEC      polyethylene glycol (GOLYTELY) 4000 mL solution Take 4,000 mL by mouth once for 1 dose  Qty: 4000 mL, Refills: 0    Associated Diagnoses: Iron deficiency anemia due to chronic blood loss      sitaGLIPtin-metFORMIN (JANUMET)  MG per tablet Take 1 tablet by mouth daily      traMADol (ULTRAM) 50 mg tablet Take 50 mg by mouth every 6 (six) hours as needed for moderate pain           STOP taking these medications       ciprofloxacin (CIPRO) 500 mg tablet Comments:   Reason for Stopping:         metroNIDAZOLE (FLAGYL) 500 mg tablet Comments:   Reason for Stopping:               No discharge procedures on file.    PDMP Review         Value Time User    PDMP  Reviewed  Yes 1/20/2024 11:52 PM Kassidy Davis MD            ED Provider  Electronically Signed by             Kassidy Davis MD  01/21/24 0149

## 2024-01-21 NOTE — ASSESSMENT & PLAN NOTE
- L1 and L2 transverse process fractures, present on admission.  - Bracing: No bracing required.   - Spine precautions.  - Monitor neurovascular exam.  - Multimodal analgesic regimen as needed.  - PT and OT evaluation and treatment as indicated.  - Outpatient follow up with the trauma clinic for re-evaluation.

## 2024-01-21 NOTE — ASSESSMENT & PLAN NOTE
- Single right-sided rib fractures (10th), present on admission.  - Continue rib fracture protocol.  - Continue to encourage incentive spirometer use and adequate pulmonary hygiene.  Currently pulling 1L mL on I.S.  - PIC score.  - Appreciate APS evaluation and recommendations.  - Continue multimodal analgesic regimen.  - Supplemental oxygen via nasal cannula as needed to maintain saturations greater than or equal to 94%.  - PT and OT evaluation and treatment as indicated.  - Outpatient follow-up in the trauma clinic for re-evaluation in approximately 2 weeks.

## 2024-01-21 NOTE — ASSESSMENT & PLAN NOTE
- Acute pain secondary to traumatic injuries.  - Patient with history of opioid use and tolerance, on Suboxone.   - Continue multimodal analgesic regimen.  - Appreciate APS evaluation and recommendations.  - Bowel regimen as long as using opioids.  - Continue to monitor pain and adjust regimen as indicated.

## 2024-01-21 NOTE — PHYSICAL THERAPY NOTE
PHYSICAL THERAPY SCREEN NOTE          Patient Name: Mckenna Felton  Today's Date: 1/21/2024 01/21/24 5426   Note Type   Note type Screen   Additional Comments PT orders received, chart review performed. Entered pt's room to educate pt on role of PT in acute care to assess mobility. Pt confirms she has been up and ambulating ind w/in the room, to/from bathroom independently w/ pt ambulating around the room currently. Pt reports she has no concerns regarding functional mobility and declines need for stair negotiation. Pt educated to follow-up w/ OPPT as needed. Will screen and DC pt from Inpatient PT caseload due to pt w/o acute PT needs w/ pt in agreement. Please re-consult PT if needs arise. Trauma AP Jessie updated.         Maricarmen Brenner, PT, DPT  01/21/24

## 2024-01-21 NOTE — CONSULTS
"Progress Note - Acute Pain Service    Mckenna Felton 48 y.o. female MRN: [id number]  Unit/Bed#: S -01 Encounter: [id number]      Assessment:   Principal Problem:    Fall down steps  Active Problems:    Essential hypertension    Diabetes mellitus, type II (HCC)    Continuous opioid dependence (HCC)    Closed fracture of one rib of right side    Closed fracture of transverse process of lumbar vertebra (HCC)    Acute pain due to trauma    Mckenna Felton is a 48 y.o. female is a very pleasant lady who presents after slipping on water from melted snow off shoes inside her house on Friday. She says she slipped backward, landing on her right bottom and back. She has been trying to use tylenol and motrin at home, in addition to her suboxone which she reports taking \"3-4x daily as needed.\" She take suboxone since she was tapered off very high doses of oxycodone for pain related to her connective tissue disorder. She did hit the back of her head and her neck, and CTH and c-spine imaging from the ED were negative for acute injuries. She has tried several medications however they have no improved her pain.     Today she is seen lying bed watching TV and states that while she is resting she has little to no pain but with any activity pain becomes excruciating. She states that none of the pain medications have helped these exacerbations. She has not taken any other opioid medications in the past for her pain. I discussed opioid rotating her to PO hydromorphone and starting a low dose ketamine infusion to improve her injury related pain.    Plan:   - Start ketamine infusion 0.1mg/kg/hr (CANCELLED, SEE ADDENDUM)  - Discontinue oxycodone  - Start PO hydromorphone 4-6mg q4h PRN for mod-severe pain  - Continue:   Tylenol 975mg q8h   Subutex 8mg q12h   Robaxin 750mg q6h   Hydromorphone IV 0.5mg q4h PRN for breakthrough pain    Bowel Regimen:  - Senna 1 tablet PO qhs    APS will continue to follow. Please contact Acute Pain Service - " [facility] via [employer] from [phone] with additional questions or concerns. See [facility] or [facility] for additional contacts and after hours information.    ADDENDUM 1/21/24 13:03    I was asked to return to the patient's room for some additional questions. Mrs. Felton asked if she could be discharged this evening as she feels she would recover better at home. I stated that I would cancel her order for ketamine since she would not be able to have that at discharge but would recommend opioid rotating to oral hydromorphone and discontinuing oxycodone. I let her know I would update the trauma service about her request and they can arrange for her discharge    Pain History  Current pain location(s): Back and posterior right thorax  Pain Scale:   0-10/10  Quality: Stabbing  24 hour history: See above    Meds/Allergies   all current active meds have been reviewed    Allergies   Allergen Reactions    Metoclopramide Shortness Of Breath    Ketorolac Tromethamine GI Intolerance    Morphine And Related     Penicillins     Sulfa Antibiotics     Sulfa Antibiotics Hives       Objective     Temp:  [97.4 °F (36.3 °C)-99.9 °F (37.7 °C)] 98.6 °F (37 °C)  HR:  [69-82] 81  Resp:  [15-20] 15  BP: (143-150)/(72-92) 143/88    Physical Exam  Vitals and nursing note reviewed.   Constitutional:       Appearance: Normal appearance.   HENT:      Head: Normocephalic and atraumatic.      Right Ear: External ear normal.      Left Ear: External ear normal.      Nose: Nose normal.      Mouth/Throat:      Mouth: Mucous membranes are moist.      Pharynx: Oropharynx is clear.   Eyes:      Conjunctiva/sclera: Conjunctivae normal.   Cardiovascular:      Rate and Rhythm: Normal rate and regular rhythm.      Pulses: Normal pulses.      Heart sounds: Normal heart sounds.   Pulmonary:      Effort: Pulmonary effort is normal.      Breath sounds: Normal breath sounds.   Chest:      Chest wall: Tenderness present.   Abdominal:      General: Abdomen is flat. Bowel  sounds are normal.      Palpations: Abdomen is soft.   Musculoskeletal:         General: Tenderness present.      Cervical back: Normal range of motion and neck supple.   Skin:     General: Skin is warm and dry.      Capillary Refill: Capillary refill takes less than 2 seconds.   Neurological:      General: No focal deficit present.      Mental Status: She is alert and oriented to person, place, and time. Mental status is at baseline.   Psychiatric:         Mood and Affect: Mood normal.         Lab Results:   Results from last 7 days   Lab Units 01/20/24  2140   WBC Thousand/uL 5.85   HEMOGLOBIN g/dL 10.6*   HEMATOCRIT % 37.0   PLATELETS Thousands/uL 305      Results from last 7 days   Lab Units 01/20/24  2140   POTASSIUM mmol/L 3.9   CHLORIDE mmol/L 101   CO2 mmol/L 24   BUN mg/dL 11   CREATININE mg/dL 0.59*   CALCIUM mg/dL 9.1   ALK PHOS U/L 54   ALT U/L 29   AST U/L 32       Imaging Studies: I have personally reviewed pertinent reports.    EKG, Pathology, and Other Studies: I have personally reviewed pertinent reports.      Counseling / Coordination of Care  Total floor / unit time spent today 15 minutes. Greater than 50% of total time was spent with the patient and / or family counseling and / or coordination of care.     Please note that the APS provides consultative services regarding pain management only.  With the exception of ketamine and epidural infusions and except when indicated, final decisions regarding starting or changing doses of analgesic medications are at the discretion of the consulting service.  Off hours consultation and/or medication management is generally not available.    Arsenio Askew MD  Acute Pain Service

## 2024-01-21 NOTE — ASSESSMENT & PLAN NOTE
Lab Results   Component Value Date    HGBA1C 7.5 (H) 11/13/2023       Recent Labs     01/21/24  0726 01/21/24  1051   POCGLU 148* 153*       Blood Sugar Average: Last 72 hrs:  (P) 150.5    - Hold home oral medications  - SSI  - Hypoglycemia protocol  - Resume home medication regimen upon discharge  - Outpatient follow up with PCP

## 2024-01-21 NOTE — ASSESSMENT & PLAN NOTE
- History of opioid dependence  - On suboxone outpatient  - APS consult, appreciate recommendations  - Multimodal pain regimen  - Bowel regimen while on narcotics

## 2024-01-21 NOTE — DISCHARGE INSTR - AVS FIRST PAGE
Take pain medication as prescribed  Continue to use incentive spiromemter  Follow up with PCP and with Trauma as an outpatient    Seek medical attention if you develop worsening chest pain or shortness of breath, dizziness/lightheadness, fevers/chills or sweats.   No heavy lifting, pushing or pulling >10 pounds and no strenuous physical activity until cleared by trauma.   No work or driving while taking narcotic pain medications and until cleared by trauma.   Use your incentive spirometer at least hourly while awake.

## 2024-01-21 NOTE — ASSESSMENT & PLAN NOTE
- Status post fall down 4 steps with the below noted injuries.  - Fall precautions.  - PT and OT evaluation and treatment as indicated.  - Case Management consultation for disposition planning.

## 2024-01-21 NOTE — H&P
"H&P - Trauma   Mckenna Felton 48 y.o. female MRN: [id number]  Unit/Bed#: S -01 Encounter: [id number]    Trauma Alert: Evaluation; trauma team notified at 1226 via in person   Model of Arrival: Self    Trauma Team: Attending Giana and Residents Hiram  Consultants:     Acute Pain Service: {routine consult; Epic consult order placed;     Assessment/Plan   Active Problems / Assessment:   Fall  Right 10th posterolateral rib fracture, mildly displaced  L1, L2 right transverse process fractures, nondisplaced  Chronic opioid dependence  UTI  DMT2     Plan:   Admit to Trauma Service  Rib fracture protocol  APS consult  Abx for UTI  Multimodal pain management      History of Present Illness     Chief Complaint: back pain  Mechanism:Fall     HPI:    Mckenna Felton is a 48 y.o. female who presents after slipping on water from melted snow off shoes inside her house. She says she slipped backward, landing on her right bottom and back. She has been trying to use tylenol and motrin at home, in addition to her suboxone which she reports taking \"3-4x daily as needed\", though as of today, the pain was out of control so she presented to the ED for further evaluation. She did hit the back of her head and her neck, and CTH and c-spine imaging from the ED were negative for acute injuries.     Review of Systems   Eyes:  Positive for photophobia.   Gastrointestinal:  Positive for nausea.   Genitourinary:  Positive for frequency.   Musculoskeletal:  Positive for back pain.   All other systems reviewed and are negative.    12-point, complete review of systems was reviewed and negative except as stated above.     Historical Information     Past Medical History:   Diagnosis Date    Connective tissue disorder (HCC)     non-specific    Degenerative disc disease, lumbar     Diabetes mellitus (HCC)     Gastritis     Hypertension      Past Surgical History:   Procedure Laterality Date     SECTION       SECTION          Social " History     Tobacco Use    Smoking status: Former     Types: Cigarettes    Smokeless tobacco: Never   Vaping Use    Vaping status: Never Used   Substance Use Topics    Alcohol use: Not Currently    Drug use: No       There is no immunization history on file for this patient.  Last Tetanus: unknown  Family History: Non-contributory     Meds/Allergies   all current active meds have been reviewed     Allergies   Allergen Reactions    Metoclopramide Shortness Of Breath    Ketorolac Tromethamine GI Intolerance    Morphine And Related     Penicillins     Sulfa Antibiotics     Sulfa Antibiotics Hives       Objective   Initial Vitals:   Temperature: 97.7 °F (36.5 °C) (01/20/24 2055)  Pulse: 74 (01/20/24 2055)  Respirations: 20 (01/20/24 2055)  Blood Pressure: 150/72 (01/20/24 2055)    Primary Survey:   Airway:                       Hospital Interventions: none  Breathing:               Effort: normal       Right breath sounds: normal       Left breath sounds: normal  Circulation:        Rhythm: regular       Rate: regular   Right Pulses Left Pulses    R radial: 2+  R femoral: 2+  R pedal: 2+     L radial: 2+  L femoral: 2+  L pedal: 2+       Disability:        GCS: Eye: 4; Verbal: 5 Motor: 6 Total: 15       Right Pupil: round;  reactive         Left Pupil:  round;  reactive      R Motor Strength L Motor Strength    R : 5/5  R dorsiflex: 5/5  R plantarflex: 5/5 L : 5/5  L dorsiflex: 5/5  L plantarflex: 5/5        Sensory:  No sensory deficit  Exposure:       Completed: No      Secondary Survey:  Physical Exam  Constitutional:       General: She is not in acute distress.     Appearance: Normal appearance. She is not ill-appearing, toxic-appearing or diaphoretic.   HENT:      Head: Normocephalic and atraumatic.   Eyes:      Extraocular Movements: Extraocular movements intact.      Pupils: Pupils are equal, round, and reactive to light.   Cardiovascular:      Rate and Rhythm: Normal rate and regular rhythm.      Pulses:  Normal pulses.      Heart sounds: Normal heart sounds.   Pulmonary:      Effort: Pulmonary effort is normal. No respiratory distress.      Breath sounds: No stridor. No wheezing, rhonchi or rales.   Abdominal:      Tenderness: There is no abdominal tenderness. There is no guarding or rebound.   Musculoskeletal:      Cervical back: Normal, normal range of motion and neck supple. No rigidity or tenderness.      Thoracic back: Tenderness (along the ribs on the right) present. No swelling, edema, deformity, signs of trauma, lacerations or spasms. Normal range of motion.      Lumbar back: Bony tenderness present. No swelling, edema, deformity, signs of trauma, lacerations, spasms or tenderness. Decreased range of motion (due to pain). No scoliosis.        Back:       Right lower leg: No edema.      Left lower leg: No edema.   Skin:     Coloration: Skin is not jaundiced or pale.      Findings: No bruising, erythema, lesion or rash.   Neurological:      General: No focal deficit present.      Mental Status: She is alert and oriented to person, place, and time.         Invasive Devices       Peripheral Intravenous Line  Duration             Peripheral IV 01/20/24 Right Antecubital <1 day                  Lab Results: I have personally reviewed all pertinent laboratory/test results 01/21/24 and in the preceding 24 hours.  Recent Labs     01/20/24  2140   WBC 5.85   HGB 10.6*   HCT 37.0      SODIUM 133*   K 3.9      CO2 24   BUN 11   CREATININE 0.59*   GLUC 135   AST 32   ALT 29   ALB 4.0   TBILI 0.27   ALKPHOS 54       Imaging Results: I have personally reviewed pertinent images saved in PACS. CT scan findings (and other pertinent positive findings on images) were discussed with radiology. My interpretation of the images/reports are as follows:  Chest Xray(s): N/A   FAST exam(s): N/A   CT Scan(s): positive for acute findings: Acute mildly displaced fracture of right 10th posterolateral rib.  Acute nondisplaced  fractures of L1 and L2 right transverse processes. Hepatomegaly with fatty infiltration. CTH and c-spine were negative for acute traumatic injuries.    Additional Xray(s): N/A     Other Studies: n/a    Code Status: Level 1 - Full Code  Advance Directive and Living Will:      Power of :    POLST:    I have spent 35 minutes with Patient  today in which greater than 50% of this time was spent in counseling/coordination of care regarding Diagnostic results, Prognosis, Risks and benefits of tx options, Instructions for management, Patient and family education, Importance of tx compliance, Risk factor reductions, Impressions, Counseling / Coordination of care, Documenting in the medical record, Reviewing / ordering tests, medicine, procedures  , Obtaining or reviewing history  , and Communicating with other healthcare professionals .

## 2024-01-21 NOTE — DISCHARGE SUMMARY
"  Discharge Summary - Mckenna Felton 48 y.o. female MRN: [id number]    Unit/Bed#: S -01 Encounter: [id number]    Admission Date:   Admission Orders (From admission, onward)       Ordered        01/21/24 0035  INPATIENT ADMISSION  Once                            Admitting Diagnosis: Back pain [M54.9]  UTI (urinary tract infection) [N39.0]  Essential hypertension [I10]  Continuous opioid dependence (HCC) [F11.20]  Lumbar transverse process fracture, closed, initial encounter ([facility]) [S32.009A]  Lumbar contusion, initial encounter [S30.0XXA]  Closed fracture of one rib of right side, initial encounter [S22.31XA]  Traumatic injury of head, initial encounter [S09.90XA]  Iron deficiency anemia, unspecified iron deficiency anemia type [D50.9]  Acute right-sided thoracic back pain [M54.6]  Encounter for post fall examination [Z04.3]  Chronic low back pain, unspecified back pain laterality, unspecified whether sciatica present [M54.50, G89.29]    HPI: per resident CELSO Gandhi:  \"Mckenna Felton is a 48 y.o. female who presents after slipping on water from melted snow off shoes inside her house. She says she slipped backward, landing on her right bottom and back. She has been trying to use tylenol and motrin at home, in addition to her suboxone which she reports taking \"3-4x daily as needed\", though as of today, the pain was out of control so she presented to the ED for further evaluation. She did hit the back of her head and her neck, and CTH and c-spine imaging from the ED were negative for acute injuries. \"    Procedures Performed: No orders of the defined types were placed in this encounter.      Summary of Hospital Course: 49 y/o female admitted after slipping and falling backward.  Came to ER because pain out of control, takes Subaxone at home. Imaging completed and was admitted to trauma.  Seen by APS, patient this evening requesting to go home.  Is ambulatory and moving independently and was discharged.  Will follow up " with PCP, and may follow up with Trauma as needed.    Significant Findings, Care, Treatment and Services Provided: CT chest abdomen pelvis w contrast    Result Date: 1/21/2024  Impression: Acute mildly displaced fracture of right 10th posterolateral rib. Acute nondisplaced fractures of L1 and L2 right transverse processes. Hepatomegaly with fatty infiltration. The study was marked in EPIC for immediate notification. Workstation performed: [id number]     CT head without contrast    Result Date: 1/21/2024  Impression: No acute intracranial abnormality. Workstation performed: [id number]     CT cervical spine without contrast    Result Date: 1/21/2024  Impression: No cervical spine fracture or traumatic malalignment. Workstation performed: [id number]         Complications:  None    Discharge Diagnosis: S/p Fall  One rib fracture right side  Closed fractures of transverse process of lumbar vertebrae    Medical Problems       Resolved Problems  Date Reviewed: 1/21/2024   None         Condition at Discharge: stable         Discharge instructions/Information to patient and family:   See after visit summary for information provided to patient and family.      Provisions for Follow-Up Care:  See after visit summary for information related to follow-up care and any pertinent home health orders.      PCP: Monae Albarran MD    Disposition: Home    Planned Readmission: No      Discharge Statement   I spent 30 minutes discharging the patient. This time was spent on the day of discharge. I had direct contact with the patient on the day of discharge. Additional documentation is required if more than 30 minutes were spent on discharge.     Discharge Medications:  See after visit summary for reconciled discharge medications provided to patient and family.

## 2024-01-21 NOTE — PROGRESS NOTES
[facility]  Progress Note  Name: Mckenna Felton I  MRN: [id number]  Unit/Bed#: S -01 I Date of Admission: 1/20/2024   Date of Service: 1/21/2024 I Hospital Day: 0    Assessment/Plan   Acute pain due to trauma  Assessment & Plan  - Acute pain secondary to traumatic injuries.  - Patient with history of opioid use and tolerance, on Suboxone.   - Continue multimodal analgesic regimen.  - Appreciate APS evaluation and recommendations.  - Bowel regimen as long as using opioids.  - Continue to monitor pain and adjust regimen as indicated.      Closed fracture of transverse process of lumbar vertebra (HCC)  Assessment & Plan  - L1 and L2 transverse process fractures, present on admission.  - Bracing: No bracing required.   - Spine precautions.  - Monitor neurovascular exam.  - Multimodal analgesic regimen as needed.  - PT and OT evaluation and treatment as indicated.  - Outpatient follow up with the trauma clinic for re-evaluation.      Closed fracture of one rib of right side  Assessment & Plan  - Single right-sided rib fractures (10th), present on admission.  - Continue rib fracture protocol.  - Continue to encourage incentive spirometer use and adequate pulmonary hygiene.  Currently pulling 1L mL on I.S.  - PIC score.  - Appreciate APS evaluation and recommendations.  - Continue multimodal analgesic regimen.  - Supplemental oxygen via nasal cannula as needed to maintain saturations greater than or equal to 94%.  - PT and OT evaluation and treatment as indicated.  - Outpatient follow-up in the trauma clinic for re-evaluation in approximately 2 weeks.      Continuous opioid dependence (HCC)  Assessment & Plan  - History of opioid dependence  - On suboxone outpatient  - APS consult, appreciate recommendations  - Multimodal pain regimen  - Bowel regimen while on narcotics    Diabetes mellitus, type II (HCC)  Assessment & Plan  Lab Results   Component Value Date    HGBA1C 7.5 (H) 11/13/2023        Recent Labs     01/21/24  0726 01/21/24  1051   POCGLU 148* 153*       Blood Sugar Average: Last 72 hrs:  (P) 150.5    - Hold home oral medications  - SSI  - Hypoglycemia protocol  - Resume home medication regimen upon discharge  - Outpatient follow up with PCP      Essential hypertension  Assessment & Plan  - Continue current medication regimen.  - Outpatient follow-up with PCP.      * Fall down steps  Assessment & Plan  - Status post fall down 4 steps with the below noted injuries.  - Fall precautions.  - PT and OT evaluation and treatment as indicated.  - Case Management consultation for disposition planning.               TRAUMA TERTIARY SURVEY NOTE    VTE Prophylaxis:Enoxaparin (Lovenox)     Disposition: Continue current level of care. Anticipate discharge home pending pain control.     Code status:  Level 1 - Full Code    Consultants: IP CONSULT TO ACUTE PAIN SERVICE  IP CONSULT TO CASE MANAGEMENT    Subjective   Transfer from: N/A    Mechanism of Injury:Fall     Chief Complaint: Back pain    HPI/Last 24 hour events: Patient reports ongoing back pain this morning that is not well controlled with current pain regimen. She otherwise denies complaints. She states she has been ambulating independently around the room. She is tolerating a diet and denies abdominal pain, nausea, vomiting.      Objective   Vitals:   Temp:  [97.4 °F (36.3 °C)-99.9 °F (37.7 °C)] 98.6 °F (37 °C)  HR:  [69-82] 81  Resp:  [15-20] 15  BP: (143-150)/(72-92) 143/88    I/O         01/19 0701  01/20 0700 01/20 0701  01/21 0700 01/21 0701 01/22 0700    Urine (mL/kg/hr)  400     Total Output  400     Net  -400                     Physical Exam:   GENERAL APPEARANCE: Patient in no acute distress.  HEENT: NCAT; PERRL, EOMs intact; Mucous membranes moist  NECK / BACK: ROM normal  CV: Regular rate and rhythm; no murmur/gallops/rubs appreciated.  CHEST / LUNGS: Clear to auscultation; no wheezes/rales/rhonci. Maintaining oxygen saturation on  room air.   ABD: NABS; soft; non-distended; non-tender.  : voiding  EXT: +2 pulses bilaterally upper & lower extremities; no edema.  NEURO: GCS 15; no focal neurologic deficits; neurovascularly intact.  SKIN: Warm, dry and well perfused; no rash; no jaundice.      Invasive Devices       Peripheral Intravenous Line  Duration             Peripheral IV 01/20/24 Right Antecubital <1 day                          PIC Score  PIC Pain Score: 1 (1/21/2024 11:28 AM)       If the Total PIC Score </=5, did you consult APS and evaluate patient for further intervention?: yes      Pain:    Incentive Spirometry  Cough  3 = Controlled  4 = Above goal volume 3 = Strong  2 = Moderate  3 = Goal to alert volume 2 = Weak  1 = Severe  2 = Below alert volume 1 = Absent     1 = Unable to perform IS      Lab Results: Results: I have personally reviewed all pertinent laboratory/tests results, BMP/CMP:   Lab Results   Component Value Date    SODIUM 133 (L) 01/20/2024    K 3.9 01/20/2024     01/20/2024    CO2 24 01/20/2024    BUN 11 01/20/2024    CREATININE 0.59 (L) 01/20/2024    CALCIUM 9.1 01/20/2024    AST 32 01/20/2024    ALT 29 01/20/2024    ALKPHOS 54 01/20/2024    EGFR 107 01/20/2024   , and CBC:   Lab Results   Component Value Date    WBC 5.85 01/20/2024    HGB 10.6 (L) 01/20/2024    HCT 37.0 01/20/2024    MCV 76 (L) 01/20/2024     01/20/2024    RBC 4.90 01/20/2024    MCH 21.6 (L) 01/20/2024    MCHC 28.6 (L) 01/20/2024    RDW 20.5 (H) 01/20/2024    MPV 9.2 01/20/2024    NRBC 0 01/20/2024       Imaging Results: I have personally reviewed pertinent reports.    Chest Xray(s): N/A   FAST exam(s): N/A   CT Scan(s): positive for acute findings: Acute mildly displaced fracture of right 10th posterolateral rib. Acute nondisplaced fractures of L1 and L2 right transverse processes.    Additional Xray(s): N/A     Other Studies: None

## 2024-01-22 ENCOUNTER — TELEPHONE (OUTPATIENT)
Dept: PHYSICAL THERAPY | Facility: OTHER | Age: 49
End: 2024-01-22

## 2024-01-22 LAB — BACTERIA UR CULT: NORMAL

## 2024-01-22 NOTE — UTILIZATION REVIEW
Initial Clinical Review    Admission: Date/Time/Statement:   Admission Orders (From admission, onward)       Ordered        01/21/24 0035  INPATIENT ADMISSION  Once                          Orders Placed This Encounter   Procedures    INPATIENT ADMISSION     Standing Status:   Standing     Number of Occurrences:   1     Order Specific Question:   Level of Care     Answer:   Med Surg [16]     Order Specific Question:   Estimated length of stay     Answer:   More than 2 Midnights     Order Specific Question:   Certification     Answer:   I certify that inpatient services are medically necessary for this patient for a duration of greater than two midnights. See H&P and MD Progress Notes for additional information about the patient's course of treatment.     ED Arrival Information       Expected   -    Arrival   1/20/2024 20:43    Acuity   Urgent              Means of arrival   Walk-In    Escorted by   Family Member    Service   Trauma    Admission type   Emergency              Arrival complaint   Fall (down 4 steps) +HS / Back Pain / unk LOC / Headache             Chief Complaint   Patient presents with    Fall     Patient reports slipping and falling on wet stairs. States she fell down 4 wooden steps hitting back and then head. Reports pain in mid-lower back. Reporting severe pain in R flank, describes as burning. Denies thinners. Rates pain 10/10.        Initial Presentation: 48 y.o. female from home to ED 1/20/24 and inpatient order placed 1/2/24 due to Fall/Right 10th posterolateral rib fracture, mildly displaced/L1, L2 right transverse process fractures, non displaced/Chronic opioid dependence/UTI/DM2.  Presented due to back pain, pressure of right flank starting after fall down 4 steps and landing on back the night prior to arrival.   + head stroke and strike right side.  Has tried ice, acetaminophen and ibuprofen without effect.  On exam: appears uncomfortable, curled on left side.  Distressed with position  changes.   RUQ and Right CVA tenderness.  Midline tenderness is appreciated in the low thoracic or lumbar region.  Tender and additionally appreciated over the right lower thoracic region entire right lumbar region.  Mild soft tissue swelling and early patchy purple ecchymosis appreciated in the right lower lumbar region.  No open skin. UA large leukocytes.   H&H 10.6/37.  ct showed:  Acute mildly displaced fracture of right 10th posterolateral rib.  Acute nondisplaced fractures of L1 and L2 right transverse processes. Hepatomegaly with fatty infiltration.   In the ED given Zofran, Dilaudid x 2, Robaxin, Lidoderm patch, Tylenol.  Plan is continuous pulse ox,.  Monitor oxygen sats.  Consult acute pain service.  Start antibiotics.    Multimodal pain management.     1/21/24: per acute pain service:  Fall down steps/acute pain due to trauma.She takes suboxone since she was tapered off very high doses of oxycodone for pain related to her connective tissue disorder.  With movement states pain excruciating.  Has pain back and posterior right thorax.   Plan:  Start PO hydromorphone 4-6mg q4h PRN for mod-severe pain.  Dc oxycodone.   Continue Tylenol, Subutex, Robaxin, Dilaudid for breakthrough.  Initially agreeable to Ketamine, then decided would be better at home.         ED Triage Vitals [01/20/24 2055]   Temperature Pulse Respirations Blood Pressure SpO2   97.7 °F (36.5 °C) 74 20 150/72 97 %      Temp Source Heart Rate Source Patient Position - Orthostatic VS BP Location FiO2 (%)   Oral Monitor Sitting Right arm --      Pain Score       10 - Worst Possible Pain          Wt Readings from Last 1 Encounters:   01/20/24 97.9 kg (215 lb 13.3 oz)     Additional Vital Signs:   01/21/24 15:41:25 99.3 °F (37.4 °C) 79 17 125/75 92 91 % -- --   01/21/24 10:53:42 98.6 °F (37 °C) 81 15 143/88 106 94 % -- --   01/21/24 07:25:10 99.9 °F (37.7 °C) 82 19 143/84 104 93 % -- --   01/21/24 01:59:07 97.4 °F (36.3 °C) Abnormal  69 -- 150/92  111 95 %       Pertinent Labs/Diagnostic Test Results:   CT head without contrast   Final Result by Lisa Mir MD (01/21 0016)      No acute intracranial abnormality.                  Workstation performed: [id number]         CT cervical spine without contrast   Final Result by Lsia Mir MD (01/21 0015)      No cervical spine fracture or traumatic malalignment.                  Workstation performed: [id number]         CT chest abdomen pelvis w contrast   Final Result by Lisa Mir MD (01/21 0017)      Acute mildly displaced fracture of right 10th posterolateral rib.      Acute nondisplaced fractures of L1 and L2 right transverse processes.      Hepatomegaly with fatty infiltration.      The study was marked in EPIC for immediate notification.         Workstation performed: [id number]             Results from last 7 days   Lab Units 01/20/24  2140   WBC Thousand/uL 5.85   HEMOGLOBIN g/dL 10.6*   HEMATOCRIT % 37.0   PLATELETS Thousands/uL 305   NEUTROS ABS Thousands/µL 2.85     Results from last 7 days   Lab Units 01/20/24  2140   SODIUM mmol/L 133*   POTASSIUM mmol/L 3.9   CHLORIDE mmol/L 101   CO2 mmol/L 24   ANION GAP mmol/L 8   BUN mg/dL 11   CREATININE mg/dL 0.59*   EGFR ml/min/1.73sq m 107   CALCIUM mg/dL 9.1     Results from last 7 days   Lab Units 01/20/24  2140   AST U/L 32   ALT U/L 29   ALK PHOS U/L 54   TOTAL PROTEIN g/dL 7.3   ALBUMIN g/dL 4.0   TOTAL BILIRUBIN mg/dL 0.27     Results from last 7 days   Lab Units 01/21/24  1600 01/21/24  1051 01/21/24  0726   POC GLUCOSE mg/dl 152* 153* 148*     Results from last 7 days   Lab Units 01/20/24  2140   GLUCOSE RANDOM mg/dL 135     Results from last 7 days   Lab Units 01/20/24  2140   CLARITY UA  Turbid   COLOR UA  Yellow   SPEC GRAV UA  1.045*   PH UA  6.0   GLUCOSE UA mg/dl Negative   KETONES UA mg/dl Negative   BLOOD UA  Small*   PROTEIN UA mg/dl 50 (1+)*   NITRITE UA  Negative   BILIRUBIN UA  Negative   UROBILINOGEN UA (BE) mg/dl <2.0    LEUKOCYTES UA  Large*   WBC UA /hpf 4-10*   RBC UA /hpf 1-2   BACTERIA UA /hpf Occasional   EPITHELIAL CELLS WET PREP /hpf Occasional         ED Treatment:   Medication Administration from 01/20/2024 2042 to 01/21/2024 0153         Date/Time Order Dose Route Action Comments     01/20/2024 2136 EST ondansetron (ZOFRAN) injection 4 mg 4 mg Intravenous Given --     01/20/2024 2136 EST HYDROmorphone (DILAUDID) injection 1 mg 1 mg Intravenous Given --     01/20/2024 2137 EST methocarbamol (ROBAXIN) tablet 500 mg 500 mg Oral Given --     01/20/2024 2137 EST lidocaine (LIDODERM) 5 % patch 1 patch 1 patch Topical Medication Applied --     01/20/2024 2216 EST iohexol (OMNIPAQUE) 350 MG/ML injection (MULTI-DOSE) 100 mL 100 mL Intravenous Given --     01/21/2024 0003 EST acetaminophen (TYLENOL) tablet 975 mg 975 mg Oral Given --     01/21/2024 0003 EST HYDROmorphone (DILAUDID) injection 1 mg 1 mg Intravenous Given --     01/21/2024 0003 EST nitrofurantoin (MACROBID) extended-release capsule 100 mg 100 mg Oral Given --     01/21/2024 0118 EST methocarbamol (ROBAXIN) tablet 750 mg 750 mg Oral Given --          Past Medical History:   Diagnosis Date    Connective tissue disorder (HCC)     non-specific    Degenerative disc disease, lumbar     Diabetes mellitus (HCC)     Gastritis     Hypertension      Present on Admission:   Continuous opioid dependence (HCC)   Diabetes mellitus, type II (HCC)   Essential hypertension      Admitting Diagnosis: Back pain [M54.9]  UTI (urinary tract infection) [N39.0]  Essential hypertension [I10]  Continuous opioid dependence (HCC) [F11.20]  Lumbar transverse process fracture, closed, initial encounter ([facility]) [S32.009A]  Lumbar contusion, initial encounter [S30.0XXA]  Closed fracture of one rib of right side, initial encounter [S22.31XA]  Traumatic injury of head, initial encounter [S09.90XA]  Iron deficiency anemia, unspecified iron deficiency anemia type [D50.9]  Acute right-sided thoracic back  pain [M54.6]  Encounter for post fall examination [Z04.3]  Chronic low back pain, unspecified back pain laterality, unspecified whether sciatica present [M54.50, G89.29]  Age/Sex: 48 y.o. female  Admission Orders:  Scheduled Medications:  Medications Discontinued During This Encounter   Medication    oxyCODONE (ROXICODONE) immediate release tablet 10 mg prn - used x 2 1/21    HYDROmorphone (DILAUDID) injection 0.5 mg  Every  2 - 4 hours PRN Route: IV  x 3 1/21/24    buprenorphine (SUBUTEX) 2 mg SL tablet 8 mg  2 times daily Route: SL     insulin lispro (HumaLOG) 100 units/mL subcutaneous injection 1-6 Units  Before Breakfast and 2 hours after meals Route: SC     lisinopril (ZESTRIL) tablet 20 mg 2 times daily Route: PO     amLODIPine (NORVASC) tablet 5 mg 2 times daily Route: PO     nitrofurantoin (MACROBID) extended-release capsule 100 mg 2 times daily with meals Route: PO     methocarbamol (ROBAXIN) tablet 750 mg  Every 6 hours scheduled Route: PO     naloxone (NARCAN) 0.04 mg/mL syringe 0.04 mg    acetaminophen (TYLENOL) tablet 975 mg Every 8 hours scheduled Route: PO     enoxaparin (LOVENOX) subcutaneous injection 30 mg  Every 12 hours Route: SC     ondansetron (ZOFRAN) injection 4 mg IV prn - used x 2 1/21/24     polyethylene glycol (MIRALAX) packet 17 g Daily Route: PO     senna (SENOKOT) tablet 17.2 mg Daily Route: PO     docusate sodium (COLACE) capsule 100 mg  2 times daily Route: PO       HYDROmorphone (DILAUDID) injection 1 mg  Dose: 1 mg  Freq: Once Route: IV  Start: 01/21/24 0215 End: 01/21/24 0230     IP CONSULT TO ACUTE PAIN SERVICE      Network Utilization Review Department  ATTENTION: Please call with any questions or concerns to [phone] and carefully listen to the prompts so that you are directed to the right person. All voicemails are confidential.   For Discharge needs, contact Care Management DC Support Team at [phone] opt. 2  Send all requests for admission clinical reviews, approved  or denied determinations and any other requests to dedicated fax number below belonging to the campus where the patient is receiving treatment. List of dedicated fax numbers for the Facilities:  FACILITY NAME UR FAX NUMBER   ADMISSION DENIALS (Administrative/Medical Necessity) [phone]   DISCHARGE SUPPORT TEAM (NETWORK) [phone]   PARENT CHILD HEALTH (Maternity/NICU/Pediatrics) [phone]   [facility] [phone]   [facility] [phone]   [facility] [phone]   [facility] [phone]   [facility] [phone]   [facility] [phone]   [facility] [phone]   [facility] [phone]   [facility] [phone]   [facility] [phone]   [facility] [phone]

## 2024-01-23 ENCOUNTER — TRANSITIONAL CARE MANAGEMENT (OUTPATIENT)
Dept: FAMILY MEDICINE CLINIC | Facility: CLINIC | Age: 49
End: 2024-01-23

## 2024-01-25 NOTE — UTILIZATION REVIEW
NOTIFICATION OF ADMISSION DISCHARGE   This is a Notification of Discharge from [facility]. Please be advised that this patient has been discharge from our facility. Below you will find the admission and discharge date and time including the patient’s disposition.   UTILIZATION REVIEW CONTACT:  Colten Dela Cruz  Utilization   Network Utilization Review Department  Phone: [phone] x carefully listen to the prompts. All voicemails are confidential.  Email: [email or web address]     ADMISSION INFORMATION  PRESENTATION DATE: 1/20/2024  8:52 PM  OBERVATION ADMISSION DATE:   INPATIENT ADMISSION DATE: 1/21/24 12:35 AM   DISCHARGE DATE: 1/21/2024  6:50 PM   DISPOSITION:Home/Self Care    Network Utilization Review Department  ATTENTION: Please call with any questions or concerns to [phone] and carefully listen to the prompts so that you are directed to the right person. All voicemails are confidential.   For Discharge needs, contact Care Management DC Support Team at [phone] opt. 2  Send all requests for admission clinical reviews, approved or denied determinations and any other requests to dedicated fax number below belonging to the campus where the patient is receiving treatment. List of dedicated fax numbers for the Facilities:  FACILITY NAME UR FAX NUMBER   ADMISSION DENIALS (Administrative/Medical Necessity) [phone]   DISCHARGE SUPPORT TEAM ([facility]) [phone]   PARENT CHILD HEALTH (Maternity/NICU/Pediatrics) [phone]   [facility] [phone]   [facility] [phone]   [facility] [phone]   [facility] [phone]   [facility] [phone]   [facility] [phone]   [facility] [phone]   [facility] [phone]   [facility]  [facility] [phone]   [facility] [phone]   [facility] [phone]

## 2024-01-26 ENCOUNTER — TELEPHONE (OUTPATIENT)
Dept: SURGERY | Facility: CLINIC | Age: 49
End: 2024-01-26

## 2024-01-26 DIAGNOSIS — S32.009A LUMBAR TRANSVERSE PROCESS FRACTURE, CLOSED, INITIAL ENCOUNTER (HCC): ICD-10-CM

## 2024-01-26 RX ORDER — NALOXONE HYDROCHLORIDE 4 MG/.1ML
SPRAY NASAL
Qty: 1 EACH | Refills: 1 | Status: SHIPPED | OUTPATIENT
Start: 2024-01-26 | End: 2025-01-25

## 2024-01-26 RX ORDER — HYDROMORPHONE HYDROCHLORIDE 2 MG/1
2-4 TABLET ORAL EVERY 6 HOURS PRN
Qty: 20 TABLET | Refills: 0 | Status: SHIPPED | OUTPATIENT
Start: 2024-01-26 | End: 2024-01-31

## 2024-01-26 NOTE — TELEPHONE ENCOUNTER
Pt is s/p TP fx; lumbar contusion, rib fracture; d/c 1/21.  Pt was prescribed Dilaudid and has been taking it 1 every 4 hours and has none left.  Pt states pain is 10/10 since she has stopped.  She is taking Robaxin and Tylenol, but is requesting a refill.  Pharmacy correct in [facility].  Pls. Advise.

## 2024-02-08 ENCOUNTER — TELEPHONE (OUTPATIENT)
Dept: GASTROENTEROLOGY | Facility: CLINIC | Age: 49
End: 2024-02-08

## 2024-02-08 ENCOUNTER — ANESTHESIA EVENT (OUTPATIENT)
Dept: ANESTHESIOLOGY | Facility: HOSPITAL | Age: 49
End: 2024-02-08

## 2024-02-08 ENCOUNTER — ANESTHESIA (OUTPATIENT)
Dept: ANESTHESIOLOGY | Facility: HOSPITAL | Age: 49
End: 2024-02-08

## 2024-02-26 ENCOUNTER — TELEPHONE (OUTPATIENT)
Dept: GASTROENTEROLOGY | Facility: CLINIC | Age: 49
End: 2024-02-26

## 2025-01-14 ENCOUNTER — APPOINTMENT (OUTPATIENT)
Dept: RADIOLOGY | Facility: MEDICAL CENTER | Age: 50
End: 2025-01-14
Payer: COMMERCIAL

## 2025-01-14 ENCOUNTER — OFFICE VISIT (OUTPATIENT)
Dept: URGENT CARE | Facility: MEDICAL CENTER | Age: 50
End: 2025-01-14
Payer: COMMERCIAL

## 2025-01-14 VITALS
SYSTOLIC BLOOD PRESSURE: 130 MMHG | RESPIRATION RATE: 18 BRPM | OXYGEN SATURATION: 94 % | BODY MASS INDEX: 33.49 KG/M2 | HEART RATE: 72 BPM | DIASTOLIC BLOOD PRESSURE: 74 MMHG | WEIGHT: 201 LBS | TEMPERATURE: 97.3 F | HEIGHT: 65 IN

## 2025-01-14 DIAGNOSIS — R05.1 ACUTE COUGH: ICD-10-CM

## 2025-01-14 DIAGNOSIS — R05.1 ACUTE COUGH: Primary | ICD-10-CM

## 2025-01-14 PROCEDURE — G0383 LEV 4 HOSP TYPE B ED VISIT: HCPCS | Performed by: PHYSICIAN ASSISTANT

## 2025-01-14 PROCEDURE — S9083 URGENT CARE CENTER GLOBAL: HCPCS | Performed by: PHYSICIAN ASSISTANT

## 2025-01-14 PROCEDURE — 71046 X-RAY EXAM CHEST 2 VIEWS: CPT

## 2025-01-14 RX ORDER — EMPAGLIFLOZIN 10 MG/1
1 TABLET, FILM COATED ORAL DAILY
COMMUNITY
Start: 2025-01-11

## 2025-01-14 RX ORDER — PREDNISONE 5 MG/1
TABLET ORAL
COMMUNITY
Start: 2025-01-12

## 2025-01-14 RX ORDER — ALBUTEROL SULFATE 90 UG/1
INHALANT RESPIRATORY (INHALATION)
COMMUNITY
Start: 2025-01-12

## 2025-01-14 RX ORDER — METFORMIN HYDROCHLORIDE 500 MG/1
1000 TABLET, EXTENDED RELEASE ORAL 2 TIMES DAILY
COMMUNITY
Start: 2025-01-11

## 2025-01-14 RX ORDER — AZITHROMYCIN 250 MG/1
TABLET, FILM COATED ORAL
COMMUNITY
Start: 2025-01-12

## 2025-01-14 RX ORDER — ALBUTEROL SULFATE 90 UG/1
2 INHALANT RESPIRATORY (INHALATION) EVERY 6 HOURS PRN
Qty: 8.5 G | Refills: 0 | Status: SHIPPED | OUTPATIENT
Start: 2025-01-14

## 2025-01-14 NOTE — PATIENT INSTRUCTIONS
Cough  Continue antibiotics as prescribed  Continue steroids as prescribed  Proventil 2 puffs every 6 hours as needed  If symptoms worsen or no improvement within 24 hours go directly to the ER  Follow up with PCP in 3-5 days.  Proceed to  ER if symptoms worsen.

## 2025-01-14 NOTE — PROGRESS NOTES
[facility]        NAME: Mckenna Felton is a 49 y.o. female  : 1975    MRN: [id number]  DATE: 2025  TIME: 4:18 PM    Assessment and Plan   Acute cough [R05.1]  1. Acute cough  XR chest pa and lateral            Patient Instructions     Cough  Continue antibiotics as prescribed  Continue steroids as prescribed  Proventil 2 puffs every 6 hours as needed  If symptoms worsen or no improvement within 24 hours go directly to the ER  Follow up with PCP in 3-5 days.  Proceed to  ER if symptoms worsen.    Chief Complaint     Chief Complaint   Patient presents with    Cough     Started with a cough on Saturday did call doctor visit and prescribed zpac, albuterol inhaler and prednisone.  She didn't  the inhaler and they ordered cough meds but hasn't been there to  yet.  Has tesslon pearls at home that she's been taking.  Started the medication  and went from dry cough to productive wet cough. Has had fever on and off 101.         History of Present Illness       49-year-old female who presents complaining of cough, congestion, chills, body aches, fevers Tmax 101.  Patient states that she was seen on telemedicine at which time she was given a Z-Grey prednisone and cough medication.  States that she has not improved since.  Denies diaphoresis, chest pain.    Cough  This is a new problem. The current episode started in the past 7 days. The problem has been waxing and waning. The problem occurs hourly. The cough is Non-productive. Associated symptoms include chills, a fever, nasal congestion, a sore throat and wheezing. Pertinent negatives include no chest pain, ear congestion, ear pain, headaches, heartburn, hemoptysis, myalgias, postnasal drip, rash, rhinorrhea, shortness of breath, sweats or weight loss. The symptoms are aggravated by exercise.       Review of Systems   Review of Systems   Constitutional:  Positive for chills and fever. Negative for weight loss.   HENT:  Positive  for sore throat. Negative for ear pain, postnasal drip and rhinorrhea.    Respiratory:  Positive for cough and wheezing. Negative for hemoptysis and shortness of breath.    Cardiovascular:  Negative for chest pain.   Gastrointestinal:  Negative for heartburn.   Musculoskeletal:  Negative for myalgias.   Skin:  Negative for rash.   Neurological:  Negative for headaches.         Current Medications       Current Outpatient Medications:     amLODIPine (NORVASC) 5 mg tablet, Take 5 mg by mouth 2 (two) times a day As directed, Disp: , Rfl:     azithromycin (ZITHROMAX) 250 mg tablet, , Disp: , Rfl:     buprenorphine (SUBUTEX) 8 mg, TAKE 3-3.5 TABLETS UNDER THE TONGUE EVERY DAY AS NEEDED FOR 30 DAYS. DO NOT COMBINE WITH OTHER OPIOIDS. CAUTION WITH OTHER PSYCHOTROPIC DRUG, Disp: , Rfl:     Jardiance 10 MG TABS tablet, Take 1 tablet by mouth in the morning, Disp: , Rfl:     metFORMIN (GLUCOPHAGE-XR) 500 mg 24 hr tablet, Take 1,000 mg by mouth 2 (two) times a day, Disp: , Rfl:     predniSONE 5 mg tablet, , Disp: , Rfl:     acetaminophen (TYLENOL) 325 mg tablet, Take 3 tablets (975 mg total) by mouth every 8 (eight) hours, Disp: 60 tablet, Rfl: 0    albuterol (PROVENTIL HFA,VENTOLIN HFA) 90 mcg/act inhaler, , Disp: , Rfl:     buprenorphine-naloxone (SUBOXONE) 8-2 mg per SL tablet, Place 1 tablet under the tongue 2 (two) times a day, Disp: , Rfl:     ergocalciferol (VITAMIN D2) 50,000 units, Take 50,000 Units by mouth once a week, Disp: , Rfl:     lisinopril (ZESTRIL) 20 mg tablet, Take 20 mg by mouth 2 (two) times a day, Disp: , Rfl:     magnesium citrate (CITROMA) 1.745 g/30 mL oral solution, Take 296 mL by mouth once for 1 dose, Disp: 296 mL, Rfl: 0    methocarbamol (ROBAXIN) 750 mg tablet, Take 1 tablet (750 mg total) by mouth every 6 (six) hours, Disp: 40 tablet, Rfl: 0    naloxone (NARCAN) 4 mg/0.1 mL nasal spray, Administer 1 spray into a nostril. If no response after 2-3 minutes, give another dose in the other nostril  "using a new spray., Disp: 1 each, Rfl: 1    ondansetron (ZOFRAN) 4 mg tablet, TAKE 1 TO 2 TABLETS BY MOUTH EVERY 12 HOURS AS NEEDED FOR NAUSEA, TAKEN 30 MINUTES BEFORE BUPRENORPHINE DOSING FOR 15 DAYS, USE LOWEST EFFEC, Disp: , Rfl:     sitaGLIPtin-metFORMIN (JANUMET)  MG per tablet, Take 1 tablet by mouth daily (Patient not taking: Reported on 2025), Disp: , Rfl:     Current Allergies     Allergies as of 2025 - Reviewed 2025   Allergen Reaction Noted    Metoclopramide Shortness Of Breath 2016    Ketorolac tromethamine GI Intolerance 2017    Morphine and codeine  2016    Sulfa antibiotics  2016            The following portions of the patient's history were reviewed and updated as appropriate: allergies, current medications, past family history, past medical history, past social history, past surgical history and problem list.     Past Medical History:   Diagnosis Date    Connective tissue disorder (HCC)     non-specific    Degenerative disc disease, lumbar     Diabetes mellitus (HCC)     Gastritis     Hypertension        Past Surgical History:   Procedure Laterality Date     SECTION       SECTION         No family history on file.      Medications have been verified.        Objective   /74   Pulse 72   Temp (!) 97.3 °F (36.3 °C)   Resp 18   Ht 5' 5\" (1.651 m)   Wt 91.2 kg (201 lb)   SpO2 94%   BMI 33.45 kg/m²        Physical Exam     Physical Exam  Constitutional:       General: She is not in acute distress.     Appearance: Normal appearance. She is well-developed. She is not diaphoretic.   HENT:      Head: Normocephalic and atraumatic.      Right Ear: Hearing, tympanic membrane, ear canal and external ear normal.      Left Ear: Hearing, tympanic membrane, ear canal and external ear normal.      Nose: Rhinorrhea present.      Mouth/Throat:      Pharynx: Uvula midline.   Cardiovascular:      Rate and Rhythm: Normal rate and regular rhythm. "      Heart sounds: Normal heart sounds.   Pulmonary:      Effort: Pulmonary effort is normal. No respiratory distress.      Breath sounds: Normal breath sounds. No stridor. No wheezing, rhonchi or rales.   Chest:      Chest wall: No tenderness.   Musculoskeletal:      Cervical back: Normal range of motion and neck supple.   Lymphadenopathy:      Cervical: Cervical adenopathy present.   Neurological:      Mental Status: She is alert.

## 2025-06-24 ENCOUNTER — ANNUAL EXAM (OUTPATIENT)
Dept: OBGYN CLINIC | Facility: CLINIC | Age: 50
End: 2025-06-24
Payer: COMMERCIAL

## 2025-06-24 VITALS
WEIGHT: 197 LBS | DIASTOLIC BLOOD PRESSURE: 80 MMHG | SYSTOLIC BLOOD PRESSURE: 134 MMHG | BODY MASS INDEX: 32.82 KG/M2 | HEIGHT: 65 IN

## 2025-06-24 DIAGNOSIS — Z12.31 SCREENING MAMMOGRAM, ENCOUNTER FOR: ICD-10-CM

## 2025-06-24 DIAGNOSIS — Z01.419 ENCOUNTER FOR GYNECOLOGICAL EXAMINATION WITHOUT ABNORMAL FINDING: Primary | ICD-10-CM

## 2025-06-24 DIAGNOSIS — N89.8 VAGINAL DISCHARGE: ICD-10-CM

## 2025-06-24 PROCEDURE — 99386 PREV VISIT NEW AGE 40-64: CPT | Performed by: OBSTETRICS & GYNECOLOGY

## 2025-06-24 RX ORDER — BUPRENORPHINE AND NALOXONE 8; 2 MG/1; MG/1
FILM, SOLUBLE BUCCAL; SUBLINGUAL
COMMUNITY
Start: 2025-06-23

## 2025-06-24 NOTE — PROGRESS NOTES
Name: Mckenna Felton      : 1975      MRN: [id number]  Encounter Provider: Reena Julian MD  Encounter Date: 2025   Encounter department: [facility] [place] [facility]  :  Assessment & Plan  Screening mammogram, encounter for    Orders:    Mammo screening bilateral w 3d and cad; Future    Vaginal discharge    Orders:    VAGINOSIS DNA PROBE    Encounter for gynecological examination without abnormal finding    Orders:    IGP, Aptima HPV, Rfx 16/18,45        History of Present Illness   HPI  Mckenna Felton is a 50 y.o. female who presents for annual exam.  Her last pelvic exam and Pap smear were 19 years ago.  She has no history of abnormal Pap smears.  She has not had a mammogram as of yet.  Period was in February and she has very mild hot flashes and night sweats.  She has had severe menorrhagia with subsequent anemia for which she has required transfusions and infusions.  She has no known history of fibroids or other abnormalities.  Pap smear was performed today.  Her major complaint relating to OB/GYN at the current time is a burning sensation when she washes the vulvar area with soap.  The vulvar area is erythematous and a BD affirm was performed.  She may have some discharge but it is minor.  She also has fairly minimal stress incontinence, having had 1 vaginal delivery and 1  section.  She is aware that the vulvar erythema and burning may be due to an allergy.  She has tried different soaps but she will start considering other products that she might be using in other parts of her body which run down during the shower.  Prescription for a mammogram was given.      Review of Systems   Constitutional:  Negative for chills, diaphoresis, fatigue, fever and unexpected weight change.   HENT:  Negative for congestion, sinus pressure, sinus pain, tinnitus and trouble swallowing.    Eyes:  Negative for visual disturbance.   Respiratory:  Negative for cough, chest tightness and shortness of  "breath.    Cardiovascular:  Negative for chest pain, palpitations and leg swelling.   Gastrointestinal:  Negative for abdominal distention, abdominal pain, anal bleeding, constipation, diarrhea, nausea, rectal pain and vomiting.   Endocrine: Negative for heat intolerance.   Genitourinary:  Negative for difficulty urinating, dysuria, flank pain, frequency, genital sores, hematuria and urgency.   Musculoskeletal:  Negative for arthralgias, back pain and joint swelling.   Skin:  Negative for rash.   Allergic/Immunologic: Negative for environmental allergies and food allergies.   Neurological:  Negative for headaches.   Hematological:  Negative for adenopathy. Does not bruise/bleed easily.   Psychiatric/Behavioral:  Negative for decreased concentration and dysphoric mood. The patient is not nervous/anxious.           Objective   /80 (BP Location: Left arm, Patient Position: Sitting, Cuff Size: Adult)   Ht 5' 5\" (1.651 m)   Wt 89.4 kg (197 lb)   LMP 02/12/2025 (Approximate)   BMI 32.78 kg/m²      Physical Exam  Vitals reviewed. Exam conducted with a chaperone present.   Constitutional:       Appearance: Normal appearance.   HENT:      Mouth/Throat:      Mouth: Mucous membranes are moist.      Pharynx: Oropharynx is clear.     Eyes:      Conjunctiva/sclera: Conjunctivae normal.      Pupils: Pupils are equal, round, and reactive to light.       Cardiovascular:      Rate and Rhythm: Normal rate and regular rhythm.      Pulses: Normal pulses.      Heart sounds: Normal heart sounds.   Pulmonary:      Effort: Pulmonary effort is normal.      Breath sounds: Normal breath sounds.   Chest:   Breasts:     Right: Normal.      Left: Normal.   Abdominal:      General: Bowel sounds are normal.      Palpations: Abdomen is soft.   Genitourinary:     General: Normal vulva.      Exam position: Lithotomy position.      Festus stage (genital): 5.      Labia:         Right: No lesion.         Left: No lesion.       Urethra: No " urethral lesion.      Vagina: Normal.      Cervix: Normal.      Uterus: Normal.       Adnexa: Right adnexa normal and left adnexa normal.      Rectum: Normal.        Comments: Area of erythema    Musculoskeletal:         General: Normal range of motion.      Cervical back: Neck supple.     Skin:     General: Skin is warm and dry.     Neurological:      General: No focal deficit present.      Mental Status: She is alert and oriented to person, place, and time.     Psychiatric:         Mood and Affect: Mood normal.

## 2025-06-25 LAB
CANDIDA RRNA VAG QL PROBE: POSITIVE
G VAGINALIS RRNA GENITAL QL PROBE: NEGATIVE
T VAGINALIS RRNA GENITAL QL PROBE: NEGATIVE

## 2025-07-05 LAB
CYTOLOGIST CVX/VAG CYTO: NORMAL
DX ICD CODE: NORMAL
HPV GENOTYPE REFLEX: NORMAL
HPV I/H RISK 4 DNA CVX QL PROBE+SIG AMP: NEGATIVE
OTHER STN SPEC: NORMAL
PATH REPORT.FINAL DX SPEC: NORMAL
SL AMB NOTE:: NORMAL
SL AMB SPECIMEN ADEQUACY: NORMAL
SL AMB TEST METHODOLOGY: NORMAL

## 2025-08-08 ENCOUNTER — OFFICE VISIT (OUTPATIENT)
Dept: URGENT CARE | Facility: MEDICAL CENTER | Age: 50
End: 2025-08-08
Payer: COMMERCIAL

## 2025-08-08 VITALS
WEIGHT: 198 LBS | OXYGEN SATURATION: 99 % | TEMPERATURE: 98.2 F | BODY MASS INDEX: 32.95 KG/M2 | HEART RATE: 74 BPM | SYSTOLIC BLOOD PRESSURE: 150 MMHG | RESPIRATION RATE: 18 BRPM | DIASTOLIC BLOOD PRESSURE: 76 MMHG

## 2025-08-08 DIAGNOSIS — R07.9 CHEST PAIN, UNSPECIFIED TYPE: Primary | ICD-10-CM

## 2025-08-08 PROCEDURE — G0382 LEV 3 HOSP TYPE B ED VISIT: HCPCS | Performed by: FAMILY MEDICINE

## 2025-08-08 PROCEDURE — 93005 ELECTROCARDIOGRAM TRACING: CPT | Performed by: FAMILY MEDICINE

## 2025-08-08 PROCEDURE — S9083 URGENT CARE CENTER GLOBAL: HCPCS | Performed by: FAMILY MEDICINE

## 2025-08-13 LAB
ATRIAL RATE: 73 BPM
P AXIS: 22 DEGREES
PR INTERVAL: 158 MS
QRS AXIS: 3 DEGREES
QRSD INTERVAL: 82 MS
QT INTERVAL: 396 MS
QTC INTERVAL: 437 MS
T WAVE AXIS: 15 DEGREES
VENTRICULAR RATE: 73 BPM